# Patient Record
Sex: FEMALE | Race: WHITE | NOT HISPANIC OR LATINO | Employment: FULL TIME | URBAN - METROPOLITAN AREA
[De-identification: names, ages, dates, MRNs, and addresses within clinical notes are randomized per-mention and may not be internally consistent; named-entity substitution may affect disease eponyms.]

---

## 2017-01-05 ENCOUNTER — RADIANT APPOINTMENT (OUTPATIENT)
Dept: MAMMOGRAPHY | Facility: CLINIC | Age: 47
End: 2017-01-05
Payer: COMMERCIAL

## 2017-01-05 ENCOUNTER — OFFICE VISIT (OUTPATIENT)
Dept: OBGYN | Facility: CLINIC | Age: 47
End: 2017-01-05
Payer: COMMERCIAL

## 2017-01-05 VITALS
DIASTOLIC BLOOD PRESSURE: 82 MMHG | SYSTOLIC BLOOD PRESSURE: 138 MMHG | HEART RATE: 80 BPM | HEIGHT: 65 IN | BODY MASS INDEX: 28.99 KG/M2 | WEIGHT: 174 LBS

## 2017-01-05 DIAGNOSIS — Z12.31 VISIT FOR SCREENING MAMMOGRAM: ICD-10-CM

## 2017-01-05 DIAGNOSIS — Z12.4 CERVICAL CANCER SCREENING: ICD-10-CM

## 2017-01-05 DIAGNOSIS — Z01.419 ENCOUNTER FOR GYNECOLOGICAL EXAMINATION WITHOUT ABNORMAL FINDING: Primary | ICD-10-CM

## 2017-01-05 DIAGNOSIS — Z11.51 SCREENING FOR HPV (HUMAN PAPILLOMAVIRUS): ICD-10-CM

## 2017-01-05 PROCEDURE — 99000 SPECIMEN HANDLING OFFICE-LAB: CPT | Performed by: PHYSICIAN ASSISTANT

## 2017-01-05 PROCEDURE — 99396 PREV VISIT EST AGE 40-64: CPT | Performed by: PHYSICIAN ASSISTANT

## 2017-01-05 PROCEDURE — 87624 HPV HI-RISK TYP POOLED RSLT: CPT | Performed by: PHYSICIAN ASSISTANT

## 2017-01-05 PROCEDURE — 77063 BREAST TOMOSYNTHESIS BI: CPT | Mod: TC

## 2017-01-05 PROCEDURE — G0202 SCR MAMMO BI INCL CAD: HCPCS | Mod: TC

## 2017-01-05 PROCEDURE — G0145 SCR C/V CYTO,THINLAYER,RESCR: HCPCS | Mod: 90 | Performed by: PHYSICIAN ASSISTANT

## 2017-01-05 ASSESSMENT — ANXIETY QUESTIONNAIRES
2. NOT BEING ABLE TO STOP OR CONTROL WORRYING: NOT AT ALL
6. BECOMING EASILY ANNOYED OR IRRITABLE: NOT AT ALL
3. WORRYING TOO MUCH ABOUT DIFFERENT THINGS: NOT AT ALL
GAD7 TOTAL SCORE: 0
5. BEING SO RESTLESS THAT IT IS HARD TO SIT STILL: NOT AT ALL
7. FEELING AFRAID AS IF SOMETHING AWFUL MIGHT HAPPEN: NOT AT ALL
1. FEELING NERVOUS, ANXIOUS, OR ON EDGE: NOT AT ALL

## 2017-01-05 ASSESSMENT — PATIENT HEALTH QUESTIONNAIRE - PHQ9: 5. POOR APPETITE OR OVEREATING: NOT AT ALL

## 2017-01-05 NOTE — MR AVS SNAPSHOT
"              After Visit Summary   1/5/2017    Chantal Lino    MRN: [id number]           Patient Information     Date Of Birth          1970        Visit Information        Provider Department      1/5/2017 8:30 AM Sarah Lomeli PA-C [facility]        Today's Diagnoses     Encounter for gynecological examination without abnormal finding [Z01.419]    -  1     Cervical cancer screening         Screening for HPV (human papillomavirus)            Follow-ups after your visit        Follow-up notes from your care team     Return in about 1 year (around 1/5/2018).      Who to contact     If you have questions or need follow up information about today's clinic visit or your schedule please contact [facility] directly at [phone].  Normal or non-critical lab and imaging results will be communicated to you by MyChart, letter or phone within 4 business days after the clinic has received the results. If you do not hear from us within 7 days, please contact the clinic through MyChart or phone. If you have a critical or abnormal lab result, we will notify you by phone as soon as possible.  Submit refill requests through [employer] or call your pharmacy and they will forward the refill request to us. Please allow 3 business days for your refill to be completed.          Additional Information About Your Visit        MyChart Information     [employer] lets you send messages to your doctor, view your test results, renew your prescriptions, schedule appointments and more. To sign up, go to [email or web address] . Click on \"Log in\" on the left side of the screen, which will take you to the Welcome page. Then click on \"Sign up Now\" on the right side of the page.     You will be asked to enter the access code listed below, as well as some personal information. Please follow the directions to create your username and password.     Your access code is: [email or web address]  Expires: " "2017  9:38 AM     Your access code will  in 90 days. If you need help or a new code, please call your [facility] clinic or [phone].        Care EveryWhere ID     This is your Care EveryWhere ID. This could be used by other organizations to access your [facility] medical records  [id number]        Your Vitals Were     Pulse Height BMI (Body Mass Index) Breastfeeding?          80 5' 5\" (1.651 m) 28.96 kg/m2 No         Blood Pressure from Last 3 Encounters:   17 138/82   11/30/15 122/78   11/16/15 128/78    Weight from Last 3 Encounters:   17 174 lb (78.926 kg)   11/30/15 171 lb (77.565 kg)   11/16/15 172 lb (78.019 kg)              We Performed the Following     Pap imaged thin layer screen with HPV - recommended age 30 - 65 years (select HPV order below)        Primary Care Provider Office Phone # Fax #    Mateusz Juarez [phone]       [address]   [facility] [address]        Thank you!     Thank you for choosing [facility]  for your care. Our goal is always to provide you with excellent care. Hearing back from our patients is one way we can continue to improve our services. Please take a few minutes to complete the written survey that you may receive in the mail after your visit with us. Thank you!             Your Updated Medication List - Protect others around you: Learn how to safely use, store and throw away your medicines at www.disposemymeds.org.          This list is accurate as of: 17  9:38 AM.  Always use your most recent med list.                   Brand Name Dispense Instructions for use    aspirin 81 MG tablet      Take 1 tablet by mouth daily.       FISH OIL PO      Take 1 tablet by mouth daily.       levonorgestrel 20 MCG/24HR IUD    MIRENA     1 each by Intrauterine route once       lisinopril-hydrochlorothiazide 20-12.5 MG per tablet    PRINZIDE/ZESTORETIC     Take 1 tablet by mouth daily       " MULTIVITAMIN PO      Take  by mouth.       VITAMIN D3 PO      Take 2,000 Units by mouth daily

## 2017-01-05 NOTE — PROGRESS NOTES
Chantal is a 46 year old  female who presents for annual exam.     Besides routine health maintenance, she has no other health concerns today .    HPI:  The patient's PCP is Mateusz Juarez MD.      Doing well, no concerns. Former Dr Laureano pt.   Thinks she is due for pap.   Has mammogram scheduled for today after visit.     Has Mirena IUD, placed 2014. No periods at all. Hx of fibroids and menorrhagia so is very happy with it. Thinks she will want another one when this one expires.    Blood pressure is above where is normally is, taking lisinopril 10mg daily. Will follow up with PCP as scheduled to check on dose. Also does fasting labs with Dr. Juarez.      Single, no children.   Works for [employer], office job.   Moods generally good.   +exercise, walks 4-5 days a week.       GYNECOLOGIC HISTORY:    No LMP recorded. Patient is not currently having periods (Reason: IUD).  Her current contraception method is: IUD.  She  reports that she has never smoked. She has never used smokeless tobacco.    Patient is sexually active.  STD testing offered?  Declined  Last PHQ-9 score on record =   PHQ-9 SCORE 2017   Total Score 0     Last GAD7 score on record =   RICHARD-7 SCORE 2017   Total Score 0     Alcohol Score = 1    HEALTH MAINTENANCE:  Cholesterol: 2016 Normal  Last Mammo: 2015, Result: normal, Next Mammo: today   Pap: 10/17/2012 WNL HPV-  Colonoscopy:  NA, Result: not applicable, Next Colonoscopy: 6 years.  Dexa:  NA    Health maintenance updated:  yes    HISTORY:  Obstetric History       T0      TAB0   SAB0   E0   M0   L0           Patient Active Problem List   Diagnosis     IUD (intrauterine device) in place     Hypertension     Past Surgical History   Procedure Laterality Date     Ent surgery       ear surgery, Ear tubes     Dilation and curettage       resection of fibroid-operative hysteroscopy, D and c and resection of submucosal fibroid     Hysteroscopy, laparoscopy,  "combined  2011     Procedure:COMBINED HYSTEROSCOPY, LAPAROSCOPY; diagnostic hysterectomy, dilation and curattage, laparoscopy left paraovarian cystectomy.; Surgeon:JASMYNE MICHAEL; Location:[facility]      Social History   Substance Use Topics     Smoking status: Never Smoker      Smokeless tobacco: Never Used     Alcohol Use: 0.0 oz/week     0 Standard drinks or equivalent per week      Comment: OCCASIONAL      Problem (# of Occurrences) Relation (Name,Age of Onset)    CEREBROVASCULAR DISEASE (3) Father:  of stroke-unspecified if father, aunt or PGF, Paternal Grandfather    Colon Cancer (1) Maternal Grandmother    Colon Polyps (1) Mother    DIABETES (1) Maternal Grandmother    HEART DISEASE (1) Sister (34): sister had heart attack at age 34    Hyperlipidemia (1) Unspecified    Hypertension (1) Unspecified    OSTEOPOROSIS (1) Mother            Current Outpatient Prescriptions   Medication Sig     Cholecalciferol (VITAMIN D3 PO) Take 2,000 Units by mouth daily     lisinopril-hydrochlorothiazide (PRINZIDE,ZESTORETIC) 20-12.5 MG per tablet Take 1 tablet by mouth daily     aspirin 81 MG tablet Take 1 tablet by mouth daily.     Omega-3 Fatty Acids (FISH OIL PO) Take 1 tablet by mouth daily.     Multiple Vitamin (MULTIVITAMIN OR) Take  by mouth.     levonorgestrel (MIRENA) 20 MCG/24HR IUD 1 each by Intrauterine route once     No current facility-administered medications for this visit.     No Known Allergies    Past medical, surgical, social and family histories were reviewed and updated in EPIC.    ROS:   12 point review of systems negative other than symptoms noted below.    EXAM:  /82 mmHg  Pulse 80  Ht 5' 5\" (1.651 m)  Wt 174 lb (78.926 kg)  BMI 28.96 kg/m2  Breastfeeding? No   BMI: Body mass index is 28.96 kg/(m^2).    PHYSICAL EXAM:  Constitutional:  Appearance: Well nourished, well developed, alert, in no acute distress  Neck:  Lymph Nodes:  No lymphadenopathy present    Thyroid:  Gland " size normal, nontender, no nodules or masses present  on palpation  Chest:  Respiratory Effort:  Breathing unlabored  Cardiovascular:    Heart: Auscultation:  Regular rate, normal rhythm, no murmurs present  Breasts: Inspection of Breasts:  No lymphadenopathy present    Palpation of Breasts and Axillae:  No masses present on palpation, no  breast tenderness    Axillary Lymph Nodes:  No lymphadenopathy present  Gastrointestinal:   Abdominal Examination:  Abdomen nontender to palpation, tone normal without rigidity or guarding, no masses present, umbilicus without lesions   Liver and Spleen:  No hepatomegaly present, liver nontender to palpation    Hernias:  No hernias present  Lymphatic: Lymph Nodes:  No other lymphadenopathy present  Skin:  General Inspection:  No rashes present, no lesions present, no areas of  discoloration    Genitalia and Groin:  No rashes present, no lesions present, no areas of  discoloration, no masses present  Neurologic/Psychiatric:    Mental Status:  Oriented X3     Pelvic Exam:  External Genitalia:     Normal appearance for age, no discharge present, no tenderness present, no inflammatory lesions present, color normal  Vagina:    Normal vaginal vault without central or paravaginal defects, no discharge present, no inflammatory lesions present, no masses present  Bladder:     Nontender to palpation  Urethra:   Urethral Body:  Urethra palpation normal, urethra structural support normal   Urethral Meatus:  No erythema or lesions present  Cervix:     Appearance healthy, no lesions present, nontender to palpation, no bleeding present, string present  Uterus:     Nontender to palpation, no masses present, position anteflexed, mobility: normal  Adnexa:     No adnexal tenderness present, no adnexal masses present  Perineum:     Perineum within normal limits, no evidence of trauma, no rashes or skin lesions present  Anus:     Anus within normal limits, no hemorrhoids present  Inguinal Lymph Nodes:      No lymphadenopathy present  Pubic Hair:     Normal pubic hair distribution for age  Genitalia and Groin:     No rashes present, no lesions present, no areas of discoloration, no masses present    COUNSELING:   Reviewed preventive health counseling, as reflected in patient instructions    BMI: Body mass index is 28.96 kg/(m^2).  Weight management plan: Discussed healthy diet and exercise guidelines and patient will follow up in 12 months in clinic to re-evaluate.    ASSESSMENT:  46 year old female with satisfactory annual exam.    ICD-10-CM    1. Encounter for gynecological examination without abnormal finding [Z01.419] Z01.419    2. Cervical cancer screening Z12.4 Pap imaged thin layer screen with HPV - recommended age 30 - 65 years (select HPV order below)   3. Screening for HPV (human papillomavirus) Z11.51 Pap imaged thin layer screen with HPV - recommended age 30 - 65 years (select HPV order below)       PLAN:  Follow up with your primary care provider for your other medical problems.  Increase physical activity and exercise.  PHQ-9/RICHARD-7 scores were discussed.  Alcohol score discussed.  Lab and pap smear results will be called to the patient.  Usual safety and preventative measures counseling done.  BMI >25  Mammogram today after visit.       Sarah Lomeli PA-C

## 2017-01-06 ASSESSMENT — ANXIETY QUESTIONNAIRES: GAD7 TOTAL SCORE: 0

## 2017-01-06 ASSESSMENT — PATIENT HEALTH QUESTIONNAIRE - PHQ9: SUM OF ALL RESPONSES TO PHQ QUESTIONS 1-9: 0

## 2017-01-09 NOTE — PROGRESS NOTES
Quick Note:    Normal mammogram reviewed, radiology notifying patient of results.   JOSE Valladares, MECCA    ______

## 2017-01-10 LAB
COPATH REPORT: NORMAL
PAP: NORMAL

## 2017-01-11 LAB
FINAL DIAGNOSIS: NORMAL
HPV HR 12 DNA CVX QL NAA+PROBE: NEGATIVE
HPV16 DNA SPEC QL NAA+PROBE: NEGATIVE
HPV18 DNA SPEC QL NAA+PROBE: NEGATIVE
SPECIMEN DESCRIPTION: NORMAL

## 2018-01-08 ENCOUNTER — OFFICE VISIT (OUTPATIENT)
Dept: OBGYN | Facility: CLINIC | Age: 48
End: 2018-01-08
Payer: COMMERCIAL

## 2018-01-08 ENCOUNTER — RADIANT APPOINTMENT (OUTPATIENT)
Dept: MAMMOGRAPHY | Facility: CLINIC | Age: 48
End: 2018-01-08
Payer: COMMERCIAL

## 2018-01-08 VITALS
WEIGHT: 179 LBS | SYSTOLIC BLOOD PRESSURE: 122 MMHG | HEIGHT: 65 IN | BODY MASS INDEX: 29.82 KG/M2 | DIASTOLIC BLOOD PRESSURE: 70 MMHG | HEART RATE: 68 BPM

## 2018-01-08 DIAGNOSIS — Z12.31 VISIT FOR SCREENING MAMMOGRAM: ICD-10-CM

## 2018-01-08 DIAGNOSIS — Z01.419 ENCOUNTER FOR GYNECOLOGICAL EXAMINATION WITHOUT ABNORMAL FINDING: Primary | ICD-10-CM

## 2018-01-08 PROCEDURE — 99396 PREV VISIT EST AGE 40-64: CPT | Performed by: OBSTETRICS & GYNECOLOGY

## 2018-01-08 PROCEDURE — 77063 BREAST TOMOSYNTHESIS BI: CPT | Mod: TC

## 2018-01-08 PROCEDURE — 77067 SCR MAMMO BI INCL CAD: CPT | Mod: TC

## 2018-01-08 ASSESSMENT — ANXIETY QUESTIONNAIRES
6. BECOMING EASILY ANNOYED OR IRRITABLE: NOT AT ALL
7. FEELING AFRAID AS IF SOMETHING AWFUL MIGHT HAPPEN: NOT AT ALL
GAD7 TOTAL SCORE: 0
5. BEING SO RESTLESS THAT IT IS HARD TO SIT STILL: NOT AT ALL
IF YOU CHECKED OFF ANY PROBLEMS ON THIS QUESTIONNAIRE, HOW DIFFICULT HAVE THESE PROBLEMS MADE IT FOR YOU TO DO YOUR WORK, TAKE CARE OF THINGS AT HOME, OR GET ALONG WITH OTHER PEOPLE: NOT DIFFICULT AT ALL
2. NOT BEING ABLE TO STOP OR CONTROL WORRYING: NOT AT ALL
1. FEELING NERVOUS, ANXIOUS, OR ON EDGE: NOT AT ALL
3. WORRYING TOO MUCH ABOUT DIFFERENT THINGS: NOT AT ALL

## 2018-01-08 ASSESSMENT — PATIENT HEALTH QUESTIONNAIRE - PHQ9
5. POOR APPETITE OR OVEREATING: NOT AT ALL
SUM OF ALL RESPONSES TO PHQ QUESTIONS 1-9: 1

## 2018-01-08 NOTE — PROGRESS NOTES
Chantal is a 47 year old  female who presents for annual exam.     Besides routine health maintenance,  she would like to discuss current Mirena IUD~ mini periods last couple of months.    HPI:  The patient's PCP is Mateusz Juarez MD. Chantal presents for her annual exam. She states she has started to have minimal menses on the Mirena. She is not sexually active currently. She has no other symptoms. She has a PCP at [facility] that is following her elevated cholesterol levels.       GYNECOLOGIC HISTORY:    No LMP recorded. Patient is not currently having periods (Reason: IUD).  Her current contraception method is: IUD.  She  reports that she has never smoked. She has never used smokeless tobacco.      Patient is sexually active.  STD testing offered?  Declined  Last PHQ-9 score on record =   PHQ-9 SCORE 2018   Total Score 1     Last GAD7 score on record =   RICHARD-7 SCORE 2018   Total Score 0     Alcohol Score = 1    HEALTH MAINTENANCE:  Cholesterol: 2017-normal  Last Mammo: one year ago, Result: normal, Next Mammo: today   Pap: (  Lab Results   Component Value Date    PAP NIL 2017    ) HPV-  Colonoscopy:  NA, Result: not applicable, Next Colonoscopy: 5 years.  Dexa:  NA    Health maintenance updated:  yes    HISTORY:  Obstetric History       T0      L0     SAB0   TAB0   Ectopic0   Multiple0   Live Births0           Patient Active Problem List   Diagnosis     IUD (intrauterine device) in place     Hypertension     Past Surgical History:   Procedure Laterality Date     DILATION AND CURETTAGE      resection of fibroid-operative hysteroscopy, D and c and resection of submucosal fibroid     ENT SURGERY      ear surgery, Ear tubes     HYSTEROSCOPY, LAPAROSCOPY, COMBINED  2011    Procedure:COMBINED HYSTEROSCOPY, LAPAROSCOPY; diagnostic hysterectomy, dilation and curattage, laparoscopy left paraovarian cystectomy.; Surgeon:JASMYNE MICHAEL; Location[facility]  "History   Substance Use Topics     Smoking status: Never Smoker     Smokeless tobacco: Never Used     Alcohol use 0.0 oz/week     0 Standard drinks or equivalent per week      Comment: OCCASIONAL      Problem (# of Occurrences) Relation (Name,Age of Onset)    CEREBROVASCULAR DISEASE (3) Other: aunt, Father:  of stroke-unspecified if father, aunt or PGF, Paternal Grandfather    Colon Cancer (1) Maternal Grandmother    Colon Polyps (1) Mother    DIABETES (1) Maternal Grandmother    HEART DISEASE (1) Sister (34): sister had heart attack at age 34    Hyperlipidemia (1) Other: not specified    Hypertension (1) Other: not specified    OSTEOPOROSIS (1) Mother            Current Outpatient Prescriptions   Medication Sig     Cholecalciferol (VITAMIN D3 PO) Take 2,000 Units by mouth daily     lisinopril-hydrochlorothiazide (PRINZIDE,ZESTORETIC) 20-12.5 MG per tablet Take 1 tablet by mouth daily     levonorgestrel (MIRENA) 20 MCG/24HR IUD 1 each by Intrauterine route once     aspirin 81 MG tablet Take 1 tablet by mouth daily.     Omega-3 Fatty Acids (FISH OIL PO) Take 1 tablet by mouth daily.     Multiple Vitamin (MULTIVITAMIN OR) Take  by mouth.     No current facility-administered medications for this visit.      No Known Allergies    Past medical, surgical, social and family histories were reviewed and updated in EPIC.    ROS:   12 point review of systems negative other than symptoms noted below.  Genitourinary: Spotting  Skin: Skin Dryness    EXAM:  /70  Pulse 68  Ht 5' 5\" (1.651 m)  Wt 179 lb (81.2 kg)  Breastfeeding? No  BMI 29.79 kg/m2   BMI: Body mass index is 29.79 kg/(m^2).    PHYSICAL EXAM:  Constitutional:  Appearance: Well nourished, well developed, alert, in no acute distress  Neck:  Lymph Nodes:  No lymphadenopathy present    Thyroid:  Gland size normal, nontender, no nodules or masses present on palpation  Chest:  Respiratory Effort:  Breathing unlabored, CTAB  Cardiovascular:    Heart: " Auscultation:  Regular rate, normal rhythm, no murmurs present  Breasts: Palpation of Breasts and Axillae:  No masses present on palpation, no breast tenderness., Axillary Lymph Nodes:  No lymphadenopathy present., No nodularity, asymmetry or nipple discharge bilaterally. and no skin changes noted.  Gastrointestinal:   Abdominal Examination:  Abdomen nontender to palpation, tone normal without rigidity or guarding, no masses present, umbilicus without lesions   Liver and Spleen:  No hepatomegaly present, liver nontender to palpation    Hernias:  No hernias present  Lymphatic: Lymph Nodes:  No other lymphadenopathy present  Skin:  General Inspection:  No rashes present, no lesions present, no areas of  discoloration    Genitalia and Groin:  No rashes present, no lesions present, no areas of  discoloration, no masses present  Neurologic/Psychiatric:    Mental Status:  Oriented X3     Pelvic Exam:  External Genitalia:     Normal appearance for age, no discharge present, no tenderness present, no inflammatory lesions present, color normal  Vagina:    Normal vaginal vault without central or paravaginal defects, no discharge present, no inflammatory lesions present, no masses present  Bladder:     Nontender to palpation  Urethra:   Urethral Body:  Urethra palpation normal, urethra structural support normal   Urethral Meatus:  No erythema or lesions present  Cervix:     Appearance healthy, no lesions present, nontender to palpation, no bleeding present, string present  Uterus:     Nontender to palpation, no masses present, position anteflexed, mobility: normal  Adnexa:     No adnexal tenderness present, no adnexal masses present  Perineum:     Perineum within normal limits, no evidence of trauma, no rashes or skin lesions present    Genitalia and Groin:     No rashes present, no lesions present, no areas of discoloration, no masses present      COUNSELING:        Regular exercise       Healthy diet/nutrition        Osteoporosis Prevention/Bone Health      BMI: Body mass index is 29.79 kg/(m^2).  Weight management plan: Discussed healthier diet. Cutting out sweets. Encouraging continued exercise.    ASSESSMENT:  47 year old female with satisfactory annual exam.    ICD-10-CM    1. Encounter for gynecological examination without abnormal finding Z01.419        PLAN:  No pap smear performed today.  Discussed normal positioning of IUD. Not due to be replaced until next year  Mammogram today.   Advised that her triglyceride level will need medication if it continues to be elevated. She was referred to her PCP for this. The last level was 201 in June of 2017.   BP is well controlled on Lisinopril-HCTZ    Shari Shah MD

## 2018-01-09 ASSESSMENT — ANXIETY QUESTIONNAIRES: GAD7 TOTAL SCORE: 0

## 2019-01-14 ENCOUNTER — ANCILLARY PROCEDURE (OUTPATIENT)
Dept: MAMMOGRAPHY | Facility: CLINIC | Age: 49
End: 2019-01-14
Payer: COMMERCIAL

## 2019-01-14 ENCOUNTER — OFFICE VISIT (OUTPATIENT)
Dept: OBGYN | Facility: CLINIC | Age: 49
End: 2019-01-14
Payer: COMMERCIAL

## 2019-01-14 VITALS
WEIGHT: 177 LBS | HEIGHT: 66 IN | DIASTOLIC BLOOD PRESSURE: 88 MMHG | SYSTOLIC BLOOD PRESSURE: 136 MMHG | BODY MASS INDEX: 28.45 KG/M2

## 2019-01-14 DIAGNOSIS — Z12.31 VISIT FOR SCREENING MAMMOGRAM: ICD-10-CM

## 2019-01-14 DIAGNOSIS — Z01.419 ENCOUNTER FOR GYNECOLOGICAL EXAMINATION WITHOUT ABNORMAL FINDING: Primary | ICD-10-CM

## 2019-01-14 PROCEDURE — 77067 SCR MAMMO BI INCL CAD: CPT | Mod: TC

## 2019-01-14 PROCEDURE — 77063 BREAST TOMOSYNTHESIS BI: CPT | Mod: TC

## 2019-01-14 PROCEDURE — 99396 PREV VISIT EST AGE 40-64: CPT | Performed by: OBSTETRICS & GYNECOLOGY

## 2019-01-14 ASSESSMENT — ANXIETY QUESTIONNAIRES

## 2019-01-14 ASSESSMENT — MIFFLIN-ST. JEOR: SCORE: 1451.68

## 2019-01-14 ASSESSMENT — PATIENT HEALTH QUESTIONNAIRE - PHQ9
SUM OF ALL RESPONSES TO PHQ QUESTIONS 1-9: 0
5. POOR APPETITE OR OVEREATING: NOT AT ALL

## 2019-01-14 NOTE — PROGRESS NOTES
Chantal is a 48 year old  female who presents for annual exam.     Besides routine health maintenance,  she would like to discuss IUD removal.    HPI:  The patient's PCP is Mateusz Juarez MD.    Starting to have vaginal spotting on the Mirena IUD. Not sexually active and would like to continue to get the benefit of period control. She is exercising up to 3 hours per week.      GYNECOLOGIC HISTORY:    No LMP recorded. Patient is not currently having periods (Reason: IUD).  Her current contraception method is: IUD.  She  reports that  has never smoked. she has never used smokeless tobacco.    Patient is not sexually active.  STD testing offered?  Declined  Last PHQ-9 score on record =   PHQ-9 SCORE 2019   PHQ-9 Total Score 0     Last GAD7 score on record =   RICHARD-7 SCORE 2019   Total Score 0     Alcohol Score = 1    HEALTH MAINTENANCE:  Cholesterol: (No results found for: CHOL 18   Total= 189, Triglycerides=222, HDL=26, HFS=839, HNB=561  Last Mammo: one year ago, Result: normal, Next Mammo: today   Pap: (  Lab Results   Component Value Date    PAP NIL 2017 WNL HPV (-)neg  Colonoscopy:  NA, Result: not applicable, Next Colonoscopy: NA years.  Dexa:  NA    Health maintenance updated:  yes    HISTORY:  Obstetric History       T0      L0     SAB0   TAB0   Ectopic0   Multiple0   Live Births0           Patient Active Problem List   Diagnosis     IUD (intrauterine device) in place     Hypertension     Past Surgical History:   Procedure Laterality Date     DILATION AND CURETTAGE      resection of fibroid-operative hysteroscopy, D and c and resection of submucosal fibroid     ENT SURGERY      ear surgery, Ear tubes     HYSTEROSCOPY, LAPAROSCOPY, COMBINED  2011    Procedure:COMBINED HYSTEROSCOPY, LAPAROSCOPY; diagnostic hysterectomy, dilation and curattage, laparoscopy left paraovarian cystectomy.; Surgeon:JASMYNE MICHAEL; Location:[facility]  "History     Tobacco Use     Smoking status: Never Smoker     Smokeless tobacco: Never Used   Substance Use Topics     Alcohol use: Yes     Alcohol/week: 0.0 oz     Comment: OCCASIONAL      Problem (# of Occurrences) Relation (Name,Age of Onset)    Cerebrovascular Disease (3) Other: aunt, Father:  of stroke-unspecified if father, aunt or PGF, Paternal Grandfather    Colon Cancer (1) Maternal Grandmother    Colon Polyps (1) Mother    Diabetes (1) Maternal Grandmother    Heart Disease (1) Sister (34): sister had heart attack at age 34    Hyperlipidemia (1) Other: not specified    Hypertension (1) Other: not specified    Osteoporosis (1) Mother            Current Outpatient Medications   Medication Sig     aspirin 81 MG tablet Take 1 tablet by mouth daily.     Cholecalciferol (VITAMIN D3 PO) Take 2,000 Units by mouth daily     levonorgestrel (MIRENA) 20 MCG/24HR IUD 1 each by Intrauterine route once     lisinopril-hydrochlorothiazide (PRINZIDE,ZESTORETIC) 20-12.5 MG per tablet Take 1 tablet by mouth daily     Multiple Vitamin (MULTIVITAMIN OR) Take  by mouth.     Omega-3 Fatty Acids (FISH OIL PO) Take 1 tablet by mouth daily.     No current facility-administered medications for this visit.      No Known Allergies    Past medical, surgical, social and family histories were reviewed and updated in EPIC.    ROS:   12 point review of systems negative other than symptoms noted below.  Genitourinary: Irregular Menses and Spotting  Skin: Skin Dryness    EXAM:  /88   Ht 1.664 m (5' 5.5\")   Wt 80.3 kg (177 lb)   Breastfeeding? No   BMI 29.01 kg/m     BMI: Body mass index is 29.01 kg/m .    PHYSICAL EXAM:  Constitutional:  Appearance: Well nourished, well developed, alert, in no acute distress  Neck:  Lymph Nodes:  No lymphadenopathy present    Thyroid:  Gland size normal, nontender, no nodules or masses present on palpation  Chest:  Respiratory Effort:  Breathing unlabored, CTAB  Cardiovascular:    Heart: " Auscultation:  Regular rate, normal rhythm, no murmurs present  Breasts: Palpation of Breasts and Axillae:  No masses present on palpation, no breast tenderness., Axillary Lymph Nodes:  No lymphadenopathy present., No nodularity, asymmetry or nipple discharge bilaterally. and no skin changes  Gastrointestinal:   Abdominal Examination:  Abdomen nontender to palpation, tone normal without rigidity or guarding, no masses present, umbilicus without lesions   Liver and Spleen:  No hepatomegaly present, liver nontender to palpation    Hernias:  No hernias present  Lymphatic: Lymph Nodes:  No other lymphadenopathy present  Skin:  General Inspection:  No rashes present, no lesions present, no areas of  discoloration    Genitalia and Groin:  No rashes present, no lesions present, no areas of  discoloration, no masses present  Neurologic/Psychiatric:    Mental Status:  Oriented X3     Pelvic Exam:  External Genitalia:     Normal appearance for age, no discharge present, no tenderness present, no inflammatory lesions present, color normal  Vagina:     Normal vaginal vault without central or paravaginal defects, no discharge present, no inflammatory lesions present, no masses present  Bladder:     Nontender to palpation  Urethra:   Urethral Body:  Urethra palpation normal, urethra structural support normal   Urethral Meatus:  No erythema or lesions present  Cervix:     Appearance healthy, no lesions present, nontender to palpation, no bleeding present. IUD strings NOT seen and not palpable.   Uterus:     Uterus: firm, normal sized and nontender, midplane in position.   Adnexa:     No adnexal tenderness present, no adnexal masses present  Perineum:     Perineum within normal limits, no evidence of trauma, no rashes or skin lesions present  Genitalia and Groin:     No rashes present, no lesions present, no areas of discoloration, no masses present      COUNSELING:   Reviewed preventive health counseling, as reflected in patient  instructions       Regular exercise       Healthy diet/nutrition    BMI: Body mass index is 29.01 kg/m .  Weight management plan: Discussed healthy diet and exercise guidelines    ASSESSMENT:  48 year old female with satisfactory annual exam.    ICD-10-CM    1. Encounter for gynecological examination without abnormal finding Z01.419        PLAN:  Pap smear not done.  IUD strings not seen. Asked to come back for IUD removal and replacement.   Discussed increased risk of heart disease with her triglyceride level from June.  Discussed checking TSH yearly.    Shari Shah MD

## 2019-01-15 ASSESSMENT — ANXIETY QUESTIONNAIRES: GAD7 TOTAL SCORE: 0

## 2019-01-21 ENCOUNTER — OFFICE VISIT (OUTPATIENT)
Dept: OBGYN | Facility: CLINIC | Age: 49
End: 2019-01-21
Payer: COMMERCIAL

## 2019-01-21 VITALS
DIASTOLIC BLOOD PRESSURE: 62 MMHG | SYSTOLIC BLOOD PRESSURE: 116 MMHG | HEIGHT: 66 IN | WEIGHT: 180 LBS | HEART RATE: 68 BPM | BODY MASS INDEX: 28.93 KG/M2

## 2019-01-21 DIAGNOSIS — Z30.433 ENCOUNTER FOR REMOVAL AND REINSERTION OF INTRAUTERINE CONTRACEPTIVE DEVICE: Primary | ICD-10-CM

## 2019-01-21 PROCEDURE — 58300 INSERT INTRAUTERINE DEVICE: CPT | Performed by: OBSTETRICS & GYNECOLOGY

## 2019-01-21 PROCEDURE — 58301 REMOVE INTRAUTERINE DEVICE: CPT | Performed by: OBSTETRICS & GYNECOLOGY

## 2019-01-21 RX ORDER — MEDROXYPROGESTERONE ACETATE 5 MG
5 TABLET ORAL 2 TIMES DAILY
Qty: 10 TABLET | Refills: 0 | Status: ON HOLD | OUTPATIENT
Start: 2019-01-21 | End: 2019-05-01

## 2019-01-21 ASSESSMENT — MIFFLIN-ST. JEOR: SCORE: 1465.28

## 2019-01-21 NOTE — PROGRESS NOTES
SUBJECTIVE:    Is a pregnancy test required: No.  Was a consent obtained?  Yes    Subjective: Chantal Lino is a 48 year old  presents for IUD and desires Mirena type IUD.  She requests removal of the IUD because the IUD effectiveness has     Patient has been given the opportunity to ask questions about all forms of birth control, including all options appropriate for Chantal Lino. Discussed that no method of birth control, except abstinence is 100% effective against pregnancy or sexually transmitted infection.     Chantal Lino understands she may have the IUD removed at any time. IUD should be removed by a health care provider and the current IUD will be removed today.    The entire removal and insertion procedure was reviewed with the patient, including care after placement.    Today's PHQ-2 Score: No flowsheet data found.    PROCEDURE:    Premedicated with ibuprofen.  A speculum exam was performed and the cervix was visualized. The IUD itself was seen at the external os, where it had not been seen in her prior exam. Using ring forceps, the string was grasped and the IUD removed intact.    Under sterile technique, cervix was visualized with speculum and prepped with Betadine solution swab x 3. Tenaculum was placed for stability. The uterus was gently straightened and sounded to 7.0 cm. IUD prepared for placement, and IUD inserted according to 's instructions without difficulty or significant ressitance, and deployed at the fundus. The strings were visualized and trimmed to 3.0 cm from the external os. Tenaculum was removed and hemostasis noted. Speculum removed.  Patient tolerated procedure well.    Lot # [id number]  Exp: 2021  NDC:52284-579-47      EBL: minimal    Complications: none      POST PROCEDURE:    Given 's handouts, including when to have IUD removed, list of danger s/sx, side effects and follow up recommended. Encouraged condom use for prevention of STD.  Advised to call for any fever, for prolonged or severe pain or bleeding, abnormal vaginal dischage, or unable to palpate strings. She was advised to use pain medications (ibuprofen) as needed for mild to moderate pain. Advised to follow-up in clinic in 4-6 weeks for IUD string check if unable to find strings or as directed by provider.     Given the movement of her prior IUD to the external os will treat with provera to decrease her bleeding. At her IUD check up I will perform a bedside sonogram to ensure her IUD is still at the fundus.    Shari Shah MD

## 2019-01-23 DIAGNOSIS — Z30.433 ENCOUNTER FOR REMOVAL AND REINSERTION OF INTRAUTERINE CONTRACEPTIVE DEVICE: ICD-10-CM

## 2019-01-23 RX ORDER — MEDROXYPROGESTERONE ACETATE 5 MG
5 TABLET ORAL 2 TIMES DAILY
Qty: 10 TABLET | Refills: 0 | OUTPATIENT
Start: 2019-01-23

## 2019-01-23 NOTE — TELEPHONE ENCOUNTER
Requested Prescriptions   Pending Prescriptions Disp Refills     medroxyPROGESTERone (PROVERA) 5 MG tablet 10 tablet 0     Sig: Take 1 tablet (5 mg) by mouth 2 times daily    There is no refill protocol information for this order        Last Written Prescription Date:  1/26/19  Last Fill Quantity: 10,  # refills: 0   Last office visit: 1/21/2019 with prescribing provider:  Pablo   Future Office Visit:   Next 5 appointments (look out 90 days)    Mar 04, 2019  3:30 PM CST  Office Visit with Shari Shah MD  [facility] ([facility] [address] [id number] [phone]

## 2019-01-23 NOTE — TELEPHONE ENCOUNTER
Requested Prescriptions   Pending Prescriptions Disp Refills     medroxyPROGESTERone (PROVERA) 5 MG tablet 10 tablet 0     Sig: Take 1 tablet (5 mg) by mouth 2 times daily    There is no refill protocol information for this order        Last Written Prescription Date:  1/21/19  Last Fill Quantity: 10,  # refills: 0   Last office visit: 1/21/2019 with prescribing provider:  Pablo   Future Office Visit:   Next 5 appointments (look out 90 days)    Mar 04, 2019  3:30 PM CST  [facility] [address] [id number] [phone]        Called pharmacy on refill request, this was an automated refill request. Refill refused and request not appropriate.

## 2019-03-01 ENCOUNTER — TELEPHONE (OUTPATIENT)
Dept: OBGYN | Facility: CLINIC | Age: 49
End: 2019-03-01

## 2019-03-01 DIAGNOSIS — N93.9 ABNORMAL UTERINE BLEEDING: Primary | ICD-10-CM

## 2019-03-01 RX ORDER — MEDROXYPROGESTERONE ACETATE 10 MG
10 TABLET ORAL DAILY
Qty: 10 TABLET | Refills: 0 | Status: SHIPPED | OUTPATIENT
Start: 2019-03-01 | End: 2019-03-04

## 2019-03-01 NOTE — TELEPHONE ENCOUNTER
Pt calling with recent Mirena IUD placement 1/21/19 by Dr Shah  Never had a period with first IUD in 2014    At time of placement, had some issues of bleeding the day of and ME sent her home with Povera x5 days which stopped bleeding.  Had some bleeding for one day beginning of February, no issues requiring tx that day.    Period started yesterday as a normal period. This morning she awoke and has been changing pad every couple of hours. Feeling fine at this time but every time she stands, blood gushes out. Did not go to work.   Denies dizziness, nausea, feeling faint or short of breath, instructed to go directly to ER if these Sx's arise.  Instructed to monitor bleeding, increase fluids and eat. Monitor bleeding. Seek ER care for sx's of excessive blood loss as above. Will consult on call provider and will call pt with instruction.    Dr Adamson was consulted and given hx of events above.  Order for Provera 10 mg for 10 days take one a day but if doesn't help, may increase by taking additional 10 mg later in the day (up to 20 mg daily) until seen in clinic at scheduled OV with ME 3/4/19 for IUD check and bedside US.   Rx sent to pharmacy    Called pt with Rx and instructions. Pt aware and will call or seek emergent care with excessive blood loss with sx's. Otherwise will monitor bleeding and take prescribed Provera as instructed by dr Adamson.  Pt verbalized understanding, in agreement with plan, and voiced no further questions.  Jia Amos, RN on 3/1/2019 at 11:53 AM

## 2019-03-04 ENCOUNTER — OFFICE VISIT (OUTPATIENT)
Dept: OBGYN | Facility: CLINIC | Age: 49
End: 2019-03-04
Payer: COMMERCIAL

## 2019-03-04 VITALS
HEIGHT: 66 IN | DIASTOLIC BLOOD PRESSURE: 80 MMHG | BODY MASS INDEX: 27.8 KG/M2 | SYSTOLIC BLOOD PRESSURE: 138 MMHG | WEIGHT: 173 LBS

## 2019-03-04 DIAGNOSIS — T83.32XA INTRAUTERINE CONTRACEPTIVE DEVICE THREADS LOST, INITIAL ENCOUNTER: Primary | ICD-10-CM

## 2019-03-04 DIAGNOSIS — N93.9 ABNORMAL UTERINE BLEEDING (AUB): ICD-10-CM

## 2019-03-04 DIAGNOSIS — N93.9 ABNORMAL UTERINE BLEEDING: ICD-10-CM

## 2019-03-04 PROCEDURE — 88305 TISSUE EXAM BY PATHOLOGIST: CPT | Performed by: OBSTETRICS & GYNECOLOGY

## 2019-03-04 PROCEDURE — 99214 OFFICE O/P EST MOD 30 MIN: CPT | Mod: 25 | Performed by: OBSTETRICS & GYNECOLOGY

## 2019-03-04 PROCEDURE — 58100 BIOPSY OF UTERUS LINING: CPT | Performed by: OBSTETRICS & GYNECOLOGY

## 2019-03-04 RX ORDER — MEDROXYPROGESTERONE ACETATE 10 MG
10 TABLET ORAL
Qty: 14 TABLET | Refills: 0 | Status: ON HOLD | OUTPATIENT
Start: 2019-03-04 | End: 2019-05-01

## 2019-03-04 ASSESSMENT — MIFFLIN-ST. JEOR: SCORE: 1433.53

## 2019-03-04 NOTE — PROGRESS NOTES
SUBJECTIVE:                                                   Chantal Lino is a 48 year old female who presents to clinic today for the following health issue(s):  Patient presents with:  Abnormal Uterine Bleeding: patient seen in ER yesterday for vaginal bleeding-had labs done. started having heavy bleeding on Friday, soaking through a pad in an hour. had exam done in ER-states they couldnt find IUD so thinks it is no longer intact. f/u for the IUD removal and reinsertion from 19 with Mirena IUD. taking medroxyprogesterone to help with the bleeding      HPI:  Chantal presents for follow up. At the time of her IUD replacement in January, the prior IUD was seen at the external os. The new IUD was placed without difficulty. She was bleeding at the time and was given provera to prevent the IUD being dislodged by heavy menses. She denies any pain since then. She had two other days of vaginal bleeding that were light. One month later with no antecedent pain she had gushing of blood from the vagina with the passage of large clots. She did not notice the IUD in the clots or in the toilet but she was not looking for it. She states that she presented to the ED at [facility] and the IUD was not seen on her pelvic exam. No additional imaging was done by them. Not an ultrasound or an xray. She was placed on provera twice a day and this has helped her bleeding.     No LMP recorded. Patient is not currently having periods (Reason: IUD)..   Patient is not sexually active, .  Using IUD for contraception.    reports that  has never smoked. she has never used smokeless tobacco.    STD testing offered?  Declined    Health maintenance updated:  yes    Today's PHQ-2 Score: No flowsheet data found.  Today's PHQ-9 Score:   PHQ-9 SCORE 2019   PHQ-9 Total Score 0     Today's RICHARD-7 Score:   RICHARD-7 SCORE 2019   Total Score 0       Problem list and histories reviewed & adjusted, as indicated.  Additional history: as  documented.    Patient Active Problem List   Diagnosis     IUD (intrauterine device) in place     Hypertension     Past Surgical History:   Procedure Laterality Date     DILATION AND CURETTAGE      resection of fibroid-operative hysteroscopy, D and c and resection of submucosal fibroid     ENT SURGERY      ear surgery, Ear tubes     HYSTEROSCOPY, LAPAROSCOPY, COMBINED  2011    Procedure:COMBINED HYSTEROSCOPY, LAPAROSCOPY; diagnostic hysterectomy, dilation and curattage, laparoscopy left paraovarian cystectomy.; Surgeon:JASMYNE MICHAEL; Location:[facility]      Social History     Tobacco Use     Smoking status: Never Smoker     Smokeless tobacco: Never Used   Substance Use Topics     Alcohol use: Yes     Alcohol/week: 0.0 oz     Comment: OCCASIONAL      Problem (# of Occurrences) Relation (Name,Age of Onset)    Cerebrovascular Disease (3) Other: aunt, Father:  of stroke-unspecified if father, aunt or PGF, Paternal Grandfather    Colon Cancer (1) Maternal Grandmother    Colon Polyps (1) Mother    Diabetes (1) Maternal Grandmother    Heart Disease (1) Sister (34): sister had heart attack at age 34    Hyperlipidemia (1) Other: not specified    Hypertension (1) Other: not specified    Osteoporosis (1) Mother            Current Outpatient Medications   Medication Sig     aspirin 81 MG tablet Take 1 tablet by mouth daily.     Cholecalciferol (VITAMIN D3 PO) Take 2,000 Units by mouth daily     levonorgestrel (MIRENA) 20 MCG/24HR IUD 1 each by Intrauterine route once     lisinopril-hydrochlorothiazide (PRINZIDE,ZESTORETIC) 20-12.5 MG per tablet Take 1 tablet by mouth daily     medroxyPROGESTERone (PROVERA) 10 MG tablet Take 1 tablet (10 mg) by mouth 2 times daily for 7 days     medroxyPROGESTERone (PROVERA) 5 MG tablet Take 1 tablet (5 mg) by mouth 2 times daily for 5 days     Multiple Vitamin (MULTIVITAMIN OR) Take  by mouth.     Omega-3 Fatty Acids (FISH OIL PO) Take 1 tablet by mouth daily.     No  "current facility-administered medications for this visit.      No Known Allergies    ROS:  12 point review of systems negative other than symptoms noted below.  Genitourinary: Heavy Bleeding with Period    OBJECTIVE:     /80   Ht 1.664 m (5' 5.5\")   Wt 78.5 kg (173 lb)   BMI 28.35 kg/m    Body mass index is 28.35 kg/m .    Exam:  Constitutional:  Appearance: Well nourished, well developed alert, in no acute distress  Chest:  Respiratory Effort:  Breathing unlabored  Gastrointestinal:  Abdominal Examination:  Abdomen nontender to palpation, tone normal without rigidity or guarding, no masses present, umbilicus without lesions; Liver/Spleen:  No hepatomegaly present, liver nontender to palpation; Hernias:  No hernias present  Neurologic/Psychiatric:  Mental Status:  Oriented X3   Cervix: IUD strings not seen.    Bedside sonogram: on transvaginal ultrasound the IUD was not seen in the endometrial cavity. Intramural fibroids seen. Bilateral ovaries seen and normal without masses. No free pelvic fluid    In-Clinic Test Results:  No results found for this or any previous visit (from the past 24 hour(s)).    ASSESSMENT/PLAN:                                                        ICD-10-CM    1. Intrauterine contraceptive device threads lost, initial encounter T83.32XA XR Abdomen 2 Views     XR Pelvis 1/2 Views   2. Abnormal uterine bleeding (AUB) N93.9 Surgical pathology exam     ENDOMETRIAL BIOPSY W/O CERVICAL DILATION   3. Abnormal uterine bleeding N93.9 medroxyPROGESTERone (PROVERA) 10 MG tablet     There are two possibilities, either the IUD was pushed out with the heavy bleeding or it perforated through the uterus. We will send her for a pelvic and abdominal xray to assess.  Her history is more suspicious for the IUD being pushed out with her heavy bleeding since she had no pain and did not have a traumatic placement.    Given her age we performed an endometrial biopsy today. She has intramural leiomyoma seen " on ultrasound today. She is seeking definitive treatment with a hysterectomy. We discussed a laparoscopic supracervical hysterectomy. I counseled her that she would need to be seen by her PCP for medication management and clearance prior to surgery. I will proceed with scheduling once I have the results of her xray.    Shari Shah MD  [facility] [place]      INDICATIONS:                                                    Is a pregnancy test required: No.  Was a consent obtained?  Yes    Having endometrial biopsy for abnormal uterine bleeding    Today's PHQ-2 Score: No flowsheet data found.    PROCEDURE;                                                      A speculum was placed in the vagina and cervix prepped with betadine. A tenaculum was attached to the cervix. A small plastic 5 mm Pipelle syringe curette was inserted into the cervical canal. The uterus was sounded to 9 cm's. A vigorous four quadrant biopsy was performed, removing amount large  of tissue and dark clotted blood. The speculum was removed. This tissue was placed in Formal[place] and sent to pathology.    The patient tolerated the procedure  well and she reported there was  cramping.      POST PROCEDURE;                                                      There  was no cramping at the time of discharge. She  tolerated the procedure well. There were no complications. Patient was discharged in stable condition.    Patient advised to call the clinic if severe pelvic pain, fever or heavy bleeding.    Shari Shah MD

## 2019-03-05 ENCOUNTER — HOSPITAL ENCOUNTER (OUTPATIENT)
Dept: GENERAL RADIOLOGY | Facility: CLINIC | Age: 49
Discharge: HOME | End: 2019-03-05
Attending: OBSTETRICS & GYNECOLOGY | Admitting: OBSTETRICS & GYNECOLOGY
Payer: COMMERCIAL

## 2019-03-05 DIAGNOSIS — T83.32XA INTRAUTERINE CONTRACEPTIVE DEVICE THREADS LOST, INITIAL ENCOUNTER: ICD-10-CM

## 2019-03-05 PROCEDURE — 74018 RADEX ABDOMEN 1 VIEW: CPT

## 2019-03-05 RX ORDER — MEDROXYPROGESTERONE ACETATE 10 MG
10 TABLET ORAL DAILY
Qty: 30 TABLET | Refills: 1 | Status: ON HOLD | OUTPATIENT
Start: 2019-03-05 | End: 2019-05-01

## 2019-03-06 ENCOUNTER — TELEPHONE (OUTPATIENT)
Dept: OBGYN | Facility: CLINIC | Age: 49
End: 2019-03-06

## 2019-03-06 LAB — COPATH REPORT: NORMAL

## 2019-03-06 NOTE — TELEPHONE ENCOUNTER
Type of surgery: GENOVEVA BARRIOS DIAG CYSTO (added per verbal from ME)  Location of surgery: [facility]  Date and time of surgery: 5/1/2019 7:30a ARRIVAL 5:30a  Surgeon: Pablo howe/Jodie to Assist  Pre-Op Appt Date: PRIMARY - [facility]  Post-Op Appt Date: TBD   Packet sent out: MAILED 3/6/2019  Pre-cert/Authorization completed:  TBD  Date: 3/6/2019 Rony howe/Luz Elena Garza  Surgery Scheduler

## 2019-03-06 NOTE — TELEPHONE ENCOUNTER
Order Questions     Question Answer Comment   Procedure name(s) - multi select Laparoscopic supracervical hysterectomy and bilateral salpingectomy    Is this a multi surgeon case? Yes    Laterality N/A    Reason for procedure Heavy menstrual bleeding    Location of Case: [facility]    Surgeon Procedure Time (incision to closure) in minutes (per procedure as applicable) 90    Note:  Surgical Case Time Needed (in minutes)   Patient Class (for admit prior to surgery, specify number of days in comments): Same day (hospital outpatient)    Why can t this outpatient surgery be done at the [facility] or [facility]? NA    Anesthesia General    H&P To Be Completed By: PCP    Post-Op Appointment 2 weeks    Procedure will be performed or supervised by: Shari Shah MD    Surgical Assistant Julian Feliciano MD    Vendor Needed? No    Blood Needed? No Blood

## 2019-03-06 NOTE — LETTER
April 18, 2019    Chantal Lino                                                                                                                     [address]    [facility] [address]      Dear Chantal,    We have made effort to obtain an accurate quote from your insurance company based on the information we have on file. Your actual coverage may differ. [facility] can NOT guarantee coverage. We recommend that if you have questions regarding coverage to call your insurance company. Our billing department is happy to assist you with your insurance claim but you are responsible for all services rendered whether or not they are paid by your insurance provider. The below information was obtained from your insurance company but again, we do not guarantee coverage.       Insurance [place] Phone # [phone]  ID [id number]         Group [id number]  Divina spoke w/ Bill on 4/18/2019    Policy Eff Date 1/1/2019 and current Yes  CoInsurance (covered at) 100% after deductible has been met.  Deductible $2000 met to date $2000  Max Out Of Pocket (MOOP) $3000 met to date $2035.12  CoPay $NA  Prior Auth Required:  No  Pre Existing Condition No  Surgeon MAGDY Shah In Network Yes  Hospital [facility] In Network Yes  Referral Needed:  No.  Mailed to Patient Yes                  If No Document why by date                  If Yes                                   Date 4/18/2019      Sincerely,         Divina Garza  Surgery Scheduler

## 2019-03-25 ENCOUNTER — TELEPHONE (OUTPATIENT)
Dept: OBGYN | Facility: CLINIC | Age: 49
End: 2019-03-25

## 2019-03-25 NOTE — TELEPHONE ENCOUNTER
Patient scheduled for surgery 5/1, she would like a refill on Provera to get her through until the surgery.  [facility].

## 2019-03-25 NOTE — TELEPHONE ENCOUNTER
Pt requesting refill of provera until surgery. Informed Rx sent 3/5/19 for 30 tabs with 1 RF. Pt states only received 20 pills. Did not know had refill. Called [facility] to see if refill available. Informed refill is available. Will fill for pt. Pt informed.

## 2019-04-04 ENCOUNTER — TRANSFERRED RECORDS (OUTPATIENT)
Dept: HEALTH INFORMATION MANAGEMENT | Facility: CLINIC | Age: 49
End: 2019-04-04

## 2019-04-18 NOTE — TELEPHONE ENCOUNTER
Insurance Co [address] Phone # [phone]  ID [id number]         Group [id number]  Divina spoke w/ Bill on 4/18/2019    Policy Eff Date 1/1/2019 and current Yes  CoInsurance (covered at) 100% after deductible has been met.  Deductible $2000 met to date $2000  Max Out Of Pocket (MOOP) $3000 met to date $2035.12  CoPay $NA  Prior Auth Required:  No  Pre Existing Condition No  Surgeon MAGDY Shah In Network Yes  Hospital [facility] In Network Yes  Referral Needed:  No.  Mailed to Patient Yes                  If No Document why by date                  If Yes                                   Date 4/18/2019      Divina Garza  Surgery Scheduler

## 2019-04-29 PROBLEM — D25.1 INTRAMURAL LEIOMYOMA OF UTERUS: Status: ACTIVE | Noted: 2019-04-29

## 2019-05-01 ENCOUNTER — ANESTHESIA (OUTPATIENT)
Dept: SURGERY | Facility: CLINIC | Age: 49
End: 2019-05-01
Payer: COMMERCIAL

## 2019-05-01 ENCOUNTER — ANESTHESIA EVENT (OUTPATIENT)
Dept: SURGERY | Facility: CLINIC | Age: 49
End: 2019-05-01
Payer: COMMERCIAL

## 2019-05-01 ENCOUNTER — HOSPITAL ENCOUNTER (OUTPATIENT)
Facility: CLINIC | Age: 49
Discharge: HOME | End: 2019-05-01
Attending: OBSTETRICS & GYNECOLOGY | Admitting: OBSTETRICS & GYNECOLOGY
Payer: COMMERCIAL

## 2019-05-01 VITALS
TEMPERATURE: 96.8 F | RESPIRATION RATE: 12 BRPM | SYSTOLIC BLOOD PRESSURE: 126 MMHG | BODY MASS INDEX: 28.96 KG/M2 | HEIGHT: 65 IN | WEIGHT: 173.8 LBS | HEART RATE: 77 BPM | DIASTOLIC BLOOD PRESSURE: 81 MMHG | OXYGEN SATURATION: 97 %

## 2019-05-01 DIAGNOSIS — Z90.710 S/P HYSTERECTOMY: Primary | ICD-10-CM

## 2019-05-01 LAB — B-HCG SERPL-ACNC: <1 IU/L (ref 0–5)

## 2019-05-01 PROCEDURE — 25000128 H RX IP 250 OP 636: Performed by: OBSTETRICS & GYNECOLOGY

## 2019-05-01 PROCEDURE — 36415 COLL VENOUS BLD VENIPUNCTURE: CPT | Performed by: OBSTETRICS & GYNECOLOGY

## 2019-05-01 PROCEDURE — 88307 TISSUE EXAM BY PATHOLOGIST: CPT | Mod: 26 | Performed by: OBSTETRICS & GYNECOLOGY

## 2019-05-01 PROCEDURE — 36000093 ZZH SURGERY LEVEL 4 1ST 30 MIN: Performed by: OBSTETRICS & GYNECOLOGY

## 2019-05-01 PROCEDURE — 25800030 ZZH RX IP 258 OP 636: Performed by: OBSTETRICS & GYNECOLOGY

## 2019-05-01 PROCEDURE — 27210794 ZZH OR GENERAL SUPPLY STERILE: Performed by: OBSTETRICS & GYNECOLOGY

## 2019-05-01 PROCEDURE — 71000027 ZZH RECOVERY PHASE 2 EACH 15 MINS: Performed by: OBSTETRICS & GYNECOLOGY

## 2019-05-01 PROCEDURE — 25800025 ZZH RX 258: Performed by: OBSTETRICS & GYNECOLOGY

## 2019-05-01 PROCEDURE — 25000125 ZZHC RX 250: Performed by: NURSE ANESTHETIST, CERTIFIED REGISTERED

## 2019-05-01 PROCEDURE — 71000012 ZZH RECOVERY PHASE 1 LEVEL 1 FIRST HR: Performed by: OBSTETRICS & GYNECOLOGY

## 2019-05-01 PROCEDURE — 25000566 ZZH SEVOFLURANE, EA 15 MIN: Performed by: OBSTETRICS & GYNECOLOGY

## 2019-05-01 PROCEDURE — 40000170 ZZH STATISTIC PRE-PROCEDURE ASSESSMENT II: Performed by: OBSTETRICS & GYNECOLOGY

## 2019-05-01 PROCEDURE — 25000125 ZZHC RX 250: Performed by: OBSTETRICS & GYNECOLOGY

## 2019-05-01 PROCEDURE — 36000063 ZZH SURGERY LEVEL 4 EA 15 ADDTL MIN: Performed by: OBSTETRICS & GYNECOLOGY

## 2019-05-01 PROCEDURE — 71000013 ZZH RECOVERY PHASE 1 LEVEL 1 EA ADDTL HR: Performed by: OBSTETRICS & GYNECOLOGY

## 2019-05-01 PROCEDURE — 58542 LSH W/T/O UT 250 G OR LESS: CPT | Performed by: OBSTETRICS & GYNECOLOGY

## 2019-05-01 PROCEDURE — 84702 CHORIONIC GONADOTROPIN TEST: CPT | Performed by: OBSTETRICS & GYNECOLOGY

## 2019-05-01 PROCEDURE — 25000132 ZZH RX MED GY IP 250 OP 250 PS 637: Performed by: OBSTETRICS & GYNECOLOGY

## 2019-05-01 PROCEDURE — 37000008 ZZH ANESTHESIA TECHNICAL FEE, 1ST 30 MIN: Performed by: OBSTETRICS & GYNECOLOGY

## 2019-05-01 PROCEDURE — 25000128 H RX IP 250 OP 636: Performed by: NURSE ANESTHETIST, CERTIFIED REGISTERED

## 2019-05-01 PROCEDURE — 37000009 ZZH ANESTHESIA TECHNICAL FEE, EACH ADDTL 15 MIN: Performed by: OBSTETRICS & GYNECOLOGY

## 2019-05-01 PROCEDURE — 25800030 ZZH RX IP 258 OP 636: Performed by: NURSE ANESTHETIST, CERTIFIED REGISTERED

## 2019-05-01 PROCEDURE — 58542 LSH W/T/O UT 250 G OR LESS: CPT | Mod: 80 | Performed by: OBSTETRICS & GYNECOLOGY

## 2019-05-01 PROCEDURE — 25000128 H RX IP 250 OP 636: Performed by: ANESTHESIOLOGY

## 2019-05-01 PROCEDURE — 88307 TISSUE EXAM BY PATHOLOGIST: CPT | Performed by: OBSTETRICS & GYNECOLOGY

## 2019-05-01 RX ORDER — LIDOCAINE HYDROCHLORIDE 20 MG/ML
INJECTION, SOLUTION INFILTRATION; PERINEURAL PRN
Status: DISCONTINUED | OUTPATIENT
Start: 2019-05-01 | End: 2019-05-01

## 2019-05-01 RX ORDER — ONDANSETRON 2 MG/ML
INJECTION INTRAMUSCULAR; INTRAVENOUS PRN
Status: DISCONTINUED | OUTPATIENT
Start: 2019-05-01 | End: 2019-05-01

## 2019-05-01 RX ORDER — OXYCODONE AND ACETAMINOPHEN 5; 325 MG/1; MG/1
2 TABLET ORAL
Status: DISCONTINUED | OUTPATIENT
Start: 2019-05-01 | End: 2019-05-01 | Stop reason: HOSPADM

## 2019-05-01 RX ORDER — ONDANSETRON 4 MG/1
4 TABLET, ORALLY DISINTEGRATING ORAL
Status: DISCONTINUED | OUTPATIENT
Start: 2019-05-01 | End: 2019-05-01 | Stop reason: HOSPADM

## 2019-05-01 RX ORDER — NEOSTIGMINE METHYLSULFATE 1 MG/ML
VIAL (ML) INJECTION PRN
Status: DISCONTINUED | OUTPATIENT
Start: 2019-05-01 | End: 2019-05-01

## 2019-05-01 RX ORDER — OXYCODONE AND ACETAMINOPHEN 5; 325 MG/1; MG/1
1-2 TABLET ORAL EVERY 4 HOURS PRN
Qty: 20 TABLET | Refills: 0 | Status: SHIPPED | OUTPATIENT
Start: 2019-05-01 | End: 2019-05-31

## 2019-05-01 RX ORDER — GLYCOPYRROLATE 0.2 MG/ML
INJECTION, SOLUTION INTRAMUSCULAR; INTRAVENOUS PRN
Status: DISCONTINUED | OUTPATIENT
Start: 2019-05-01 | End: 2019-05-01

## 2019-05-01 RX ORDER — IBUPROFEN 600 MG/1
600 TABLET, FILM COATED ORAL
Status: COMPLETED | OUTPATIENT
Start: 2019-05-01 | End: 2019-05-01

## 2019-05-01 RX ORDER — ONDANSETRON 4 MG/1
4-8 TABLET, ORALLY DISINTEGRATING ORAL EVERY 8 HOURS PRN
Qty: 4 TABLET | Refills: 0 | Status: SHIPPED | OUTPATIENT
Start: 2019-05-01 | End: 2019-05-31

## 2019-05-01 RX ORDER — MAGNESIUM HYDROXIDE 1200 MG/15ML
LIQUID ORAL PRN
Status: DISCONTINUED | OUTPATIENT
Start: 2019-05-01 | End: 2019-05-01 | Stop reason: HOSPADM

## 2019-05-01 RX ORDER — FENTANYL CITRATE 50 UG/ML
INJECTION, SOLUTION INTRAMUSCULAR; INTRAVENOUS PRN
Status: DISCONTINUED | OUTPATIENT
Start: 2019-05-01 | End: 2019-05-01

## 2019-05-01 RX ORDER — IBUPROFEN 600 MG/1
600 TABLET, FILM COATED ORAL EVERY 6 HOURS PRN
Qty: 90 TABLET | Refills: 0 | Status: SHIPPED | OUTPATIENT
Start: 2019-05-01 | End: 2019-05-31

## 2019-05-01 RX ORDER — PHENAZOPYRIDINE HYDROCHLORIDE 200 MG/1
200 TABLET, FILM COATED ORAL ONCE
Status: COMPLETED | OUTPATIENT
Start: 2019-05-01 | End: 2019-05-01

## 2019-05-01 RX ORDER — BUPIVACAINE HYDROCHLORIDE AND EPINEPHRINE 5; 5 MG/ML; UG/ML
INJECTION, SOLUTION EPIDURAL; INTRACAUDAL; PERINEURAL
Status: DISCONTINUED
Start: 2019-05-01 | End: 2019-05-01 | Stop reason: HOSPADM

## 2019-05-01 RX ORDER — FENTANYL CITRATE 50 UG/ML
25-50 INJECTION, SOLUTION INTRAMUSCULAR; INTRAVENOUS
Status: DISCONTINUED | OUTPATIENT
Start: 2019-05-01 | End: 2019-05-01 | Stop reason: HOSPADM

## 2019-05-01 RX ORDER — ACETAMINOPHEN 325 MG/1
975 TABLET ORAL ONCE
Status: COMPLETED | OUTPATIENT
Start: 2019-05-01 | End: 2019-05-01

## 2019-05-01 RX ORDER — CEFAZOLIN SODIUM 2 G/100ML
2 INJECTION, SOLUTION INTRAVENOUS
Status: COMPLETED | OUTPATIENT
Start: 2019-05-01 | End: 2019-05-01

## 2019-05-01 RX ORDER — PROPOFOL 10 MG/ML
INJECTION, EMULSION INTRAVENOUS PRN
Status: DISCONTINUED | OUTPATIENT
Start: 2019-05-01 | End: 2019-05-01

## 2019-05-01 RX ORDER — PROPOFOL 10 MG/ML
INJECTION, EMULSION INTRAVENOUS CONTINUOUS PRN
Status: DISCONTINUED | OUTPATIENT
Start: 2019-05-01 | End: 2019-05-01

## 2019-05-01 RX ORDER — CEFAZOLIN SODIUM 1 G/3ML
1 INJECTION, POWDER, FOR SOLUTION INTRAMUSCULAR; INTRAVENOUS SEE ADMIN INSTRUCTIONS
Status: DISCONTINUED | OUTPATIENT
Start: 2019-05-01 | End: 2019-05-01 | Stop reason: HOSPADM

## 2019-05-01 RX ORDER — NALOXONE HYDROCHLORIDE 0.4 MG/ML
.1-.4 INJECTION, SOLUTION INTRAMUSCULAR; INTRAVENOUS; SUBCUTANEOUS
Status: DISCONTINUED | OUTPATIENT
Start: 2019-05-01 | End: 2019-05-01 | Stop reason: HOSPADM

## 2019-05-01 RX ORDER — ONDANSETRON 2 MG/ML
4 INJECTION INTRAMUSCULAR; INTRAVENOUS EVERY 30 MIN PRN
Status: DISCONTINUED | OUTPATIENT
Start: 2019-05-01 | End: 2019-05-01 | Stop reason: HOSPADM

## 2019-05-01 RX ORDER — AMOXICILLIN 250 MG
1-2 CAPSULE ORAL 2 TIMES DAILY
Qty: 30 TABLET | Refills: 0 | Status: SHIPPED | OUTPATIENT
Start: 2019-05-01 | End: 2019-05-31

## 2019-05-01 RX ORDER — HYDROMORPHONE HYDROCHLORIDE 1 MG/ML
.3-.5 INJECTION, SOLUTION INTRAMUSCULAR; INTRAVENOUS; SUBCUTANEOUS EVERY 5 MIN PRN
Status: DISCONTINUED | OUTPATIENT
Start: 2019-05-01 | End: 2019-05-01 | Stop reason: HOSPADM

## 2019-05-01 RX ORDER — SODIUM CHLORIDE, SODIUM LACTATE, POTASSIUM CHLORIDE, CALCIUM CHLORIDE 600; 310; 30; 20 MG/100ML; MG/100ML; MG/100ML; MG/100ML
INJECTION, SOLUTION INTRAVENOUS CONTINUOUS
Status: DISCONTINUED | OUTPATIENT
Start: 2019-05-01 | End: 2019-05-01 | Stop reason: HOSPADM

## 2019-05-01 RX ORDER — BUPIVACAINE HYDROCHLORIDE AND EPINEPHRINE 5; 5 MG/ML; UG/ML
INJECTION, SOLUTION PERINEURAL PRN
Status: DISCONTINUED | OUTPATIENT
Start: 2019-05-01 | End: 2019-05-01 | Stop reason: HOSPADM

## 2019-05-01 RX ORDER — VASOPRESSIN 20 U/ML
INJECTION PARENTERAL
Status: DISCONTINUED
Start: 2019-05-01 | End: 2019-05-01 | Stop reason: HOSPADM

## 2019-05-01 RX ORDER — SODIUM CHLORIDE, SODIUM LACTATE, POTASSIUM CHLORIDE, CALCIUM CHLORIDE 600; 310; 30; 20 MG/100ML; MG/100ML; MG/100ML; MG/100ML
INJECTION, SOLUTION INTRAVENOUS CONTINUOUS PRN
Status: DISCONTINUED | OUTPATIENT
Start: 2019-05-01 | End: 2019-05-01

## 2019-05-01 RX ORDER — DEXAMETHASONE SODIUM PHOSPHATE 4 MG/ML
INJECTION, SOLUTION INTRA-ARTICULAR; INTRALESIONAL; INTRAMUSCULAR; INTRAVENOUS; SOFT TISSUE PRN
Status: DISCONTINUED | OUTPATIENT
Start: 2019-05-01 | End: 2019-05-01

## 2019-05-01 RX ORDER — ONDANSETRON 4 MG/1
4 TABLET, ORALLY DISINTEGRATING ORAL EVERY 30 MIN PRN
Status: DISCONTINUED | OUTPATIENT
Start: 2019-05-01 | End: 2019-05-01 | Stop reason: HOSPADM

## 2019-05-01 RX ADMIN — ROCURONIUM BROMIDE 50 MG: 10 INJECTION INTRAVENOUS at 07:36

## 2019-05-01 RX ADMIN — IBUPROFEN 600 MG: 600 TABLET ORAL at 10:22

## 2019-05-01 RX ADMIN — LIDOCAINE HYDROCHLORIDE 100 MG: 20 INJECTION, SOLUTION INFILTRATION; PERINEURAL at 07:36

## 2019-05-01 RX ADMIN — PROPOFOL 200 MG: 10 INJECTION, EMULSION INTRAVENOUS at 07:36

## 2019-05-01 RX ADMIN — ACETAMINOPHEN 975 MG: 325 TABLET, FILM COATED ORAL at 06:04

## 2019-05-01 RX ADMIN — ROCURONIUM BROMIDE 10 MG: 10 INJECTION INTRAVENOUS at 08:05

## 2019-05-01 RX ADMIN — CEFAZOLIN SODIUM 2 G: 2 INJECTION, SOLUTION INTRAVENOUS at 07:43

## 2019-05-01 RX ADMIN — ONDANSETRON 4 MG: 2 INJECTION INTRAMUSCULAR; INTRAVENOUS at 09:03

## 2019-05-01 RX ADMIN — PROPOFOL 30 MCG/KG/MIN: 10 INJECTION, EMULSION INTRAVENOUS at 08:02

## 2019-05-01 RX ADMIN — FENTANYL CITRATE 50 MCG: 50 INJECTION, SOLUTION INTRAMUSCULAR; INTRAVENOUS at 08:28

## 2019-05-01 RX ADMIN — NEOSTIGMINE METHYLSULFATE 4 MG: 1 INJECTION, SOLUTION INTRAVENOUS at 09:10

## 2019-05-01 RX ADMIN — MIDAZOLAM 2 MG: 1 INJECTION INTRAMUSCULAR; INTRAVENOUS at 07:34

## 2019-05-01 RX ADMIN — Medication 0.5 MG: at 10:03

## 2019-05-01 RX ADMIN — DEXAMETHASONE SODIUM PHOSPHATE 4 MG: 4 INJECTION, SOLUTION INTRA-ARTICULAR; INTRALESIONAL; INTRAMUSCULAR; INTRAVENOUS; SOFT TISSUE at 07:44

## 2019-05-01 RX ADMIN — FENTANYL CITRATE 50 MCG: 50 INJECTION, SOLUTION INTRAMUSCULAR; INTRAVENOUS at 08:12

## 2019-05-01 RX ADMIN — DEXMEDETOMIDINE HYDROCHLORIDE 20 MCG: 100 INJECTION, SOLUTION INTRAVENOUS at 08:23

## 2019-05-01 RX ADMIN — SODIUM CHLORIDE, POTASSIUM CHLORIDE, SODIUM LACTATE AND CALCIUM CHLORIDE: 600; 310; 30; 20 INJECTION, SOLUTION INTRAVENOUS at 09:24

## 2019-05-01 RX ADMIN — GLYCOPYRROLATE 0.8 MG: 0.2 INJECTION, SOLUTION INTRAMUSCULAR; INTRAVENOUS at 09:10

## 2019-05-01 RX ADMIN — SODIUM CHLORIDE, POTASSIUM CHLORIDE, SODIUM LACTATE AND CALCIUM CHLORIDE: 600; 310; 30; 20 INJECTION, SOLUTION INTRAVENOUS at 07:33

## 2019-05-01 RX ADMIN — FENTANYL CITRATE 100 MCG: 50 INJECTION, SOLUTION INTRAMUSCULAR; INTRAVENOUS at 07:44

## 2019-05-01 RX ADMIN — PHENAZOPYRIDINE HYDROCHLORIDE 200 MG: 200 TABLET ORAL at 06:04

## 2019-05-01 ASSESSMENT — LIFESTYLE VARIABLES: TOBACCO_USE: 0

## 2019-05-01 ASSESSMENT — MIFFLIN-ST. JEOR: SCORE: 1429.23

## 2019-05-01 NOTE — ANESTHESIA PREPROCEDURE EVALUATION
Procedure: Procedure(s):  HYSTERECTOMY, SUPRACERVICAL, LAPAROSCOPIC  LAPAROSCOPIC BILATERAL SALPINGECTOMY  CYSTOSCOPY  Preop diagnosis: HEAVY MENSTRUAL BLEEDING    No Known Allergies  Past Medical History:   Diagnosis Date     Hyperlipidemia      Hypertension     On Lisinopril     Intramural leiomyoma of uterus 4/29/2019     IUD (intrauterine device) in place 02/2014    Mirena, for heavy periods     Leiomyoma of uterus 11/09    had hysteroscopy, and resection of fibroid     Menorrhagia 2009    had operative hysteroscopy and resection of fibroid     Vitamin D deficiency      Past Surgical History:   Procedure Laterality Date     DILATION AND CURETTAGE  11/09    resection of fibroid-operative hysteroscopy, D and c and resection of submucosal fibroid     ENT SURGERY      ear surgery, Ear tubes, tympanoplasty as child     GYN SURGERY      ovarian cyst surgery     HYSTEROSCOPY, LAPAROSCOPY, COMBINED  12/1/2011    Procedure:COMBINED HYSTEROSCOPY, LAPAROSCOPY; diagnostic hysterectomy, dilation and curattage, laparoscopy left paraovarian cystectomy.; Surgeon:JASMYNE MICHAEL; Location:[facility]     Social History     Tobacco Use     Smoking status: Never Smoker     Smokeless tobacco: Never Used   Substance Use Topics     Alcohol use: Yes     Alcohol/week: 0.0 oz     Comment: OCCASIONAL     Prior to Admission medications    Medication Sig Start Date End Date Taking? Authorizing Provider   Cholecalciferol (VITAMIN D3 PO) Take 2,000 Units by mouth every morning    Yes Reported, Patient   lisinopril-hydrochlorothiazide (PRINZIDE,ZESTORETIC) 20-12.5 MG per tablet Take 1 tablet by mouth every morning    Yes Reported, Patient   medroxyPROGESTERone (PROVERA) 10 MG tablet Take 1 tablet (10 mg) by mouth daily 3/5/19  Yes Shari Shah MD   Multiple Vitamin (MULTIVITAMIN OR) Take 1 tablet by mouth every morning    Yes Reported, Patient   Omega-3 Fatty Acids (FISH OIL) 1200 MG capsule Take 1,200 mg by mouth every  morning    Yes Reported, Patient   medroxyPROGESTERone (PROVERA) 10 MG tablet Take 1 tablet (10 mg) by mouth 2 times daily for 7 days 3/4/19 3/11/19  Shari Shah MD   medroxyPROGESTERone (PROVERA) 5 MG tablet Take 1 tablet (5 mg) by mouth 2 times daily for 5 days 1/21/19 3/4/19  Shari Shah MD     Current Facility-Administered Medications Ordered in Epic   Medication Dose Route Frequency Last Rate Last Dose     ceFAZolin (ANCEF) 1 g vial to attach to  ml bag for ADULT or 50 ml bag for PEDS  1 g Intravenous See Admin Instructions         ceFAZolin (ANCEF) intermittent infusion 2 g in 100 mL dextrose PRE-MIX  2 g Intravenous Pre-Op/Pre-procedure x 1 dose         No current [facility]-ordered outpatient medications on file.       Wt Readings from Last 1 Encounters:   05/01/19 78.8 kg (173 lb 12.8 oz)     Temp Readings from Last 1 Encounters:   05/01/19 35.7  C (96.2  F) (Oral)     BP Readings from Last 6 Encounters:   05/01/19 149/85   03/04/19 138/80   01/21/19 116/62   01/14/19 136/88   01/08/18 122/70   01/05/17 138/82     Pulse Readings from Last 4 Encounters:   01/21/19 68   01/08/18 68   01/05/17 80   11/30/15 60     Resp Readings from Last 1 Encounters:   05/01/19 16     SpO2 Readings from Last 1 Encounters:   05/01/19 95%     Recent Labs   Lab Test 12/01/11  1000   POTASSIUM 4.3     No results for input(s): AST, ALT, ALKPHOS, BILITOTAL, LIPASE in the last 34052 hours.  No results for input(s): WBC, HGB, PLT in the last 05745 hours.  No results for input(s): ABO, RH in the last 85795 hours.  No results for input(s): INR, PTT in the last 99777 hours.   No results for input(s): TROPI in the last 53550 hours.  No results for input(s): PH, PCO2, PO2, HCO3 in the last 00884 hours.  Recent Labs   Lab Test 12/01/11   HCG negative     No results found for this or any previous visit (from the past 744 hour(s)).    RECENT LABS:   ECG:   ECHO:   Anesthesia Pre-Procedure  Evaluation    Patient: Chantal Lino   MRN: [id number] : 1970          Preoperative Diagnosis: HEAVY MENSTRUAL BLEEDING    Procedure(s):  HYSTERECTOMY, SUPRACERVICAL, LAPAROSCOPIC  LAPAROSCOPIC BILATERAL SALPINGECTOMY  CYSTOSCOPY    Past Medical History:   Diagnosis Date     Hyperlipidemia      Hypertension     On Lisinopril     Intramural leiomyoma of uterus 2019     IUD (intrauterine device) in place 2014    Mirena, for heavy periods     Leiomyoma of uterus     had hysteroscopy, and resection of fibroid     Menorrhagia     had operative hysteroscopy and resection of fibroid     Vitamin D deficiency      Past Surgical History:   Procedure Laterality Date     DILATION AND CURETTAGE      resection of fibroid-operative hysteroscopy, D and c and resection of submucosal fibroid     ENT SURGERY      ear surgery, Ear tubes, tympanoplasty as child     GYN SURGERY      ovarian cyst surgery     HYSTEROSCOPY, LAPAROSCOPY, COMBINED  2011    Procedure:COMBINED HYSTEROSCOPY, LAPAROSCOPY; diagnostic hysterectomy, dilation and curattage, laparoscopy left paraovarian cystectomy.; Surgeon:JASMYNE MICHAEL; Location:[facility]       Anesthesia Evaluation     . Pt has had prior anesthetic.     No history of anesthetic complications          ROS/MED HX    ENT/Pulmonary:      (-) tobacco use and sleep apnea   Neurologic:       Cardiovascular:     (+) hypertension----. : . . . :. .       METS/Exercise Tolerance:     Hematologic:         Musculoskeletal:         GI/Hepatic:        (-) GERD   Renal/Genitourinary:         Endo:         Psychiatric:         Infectious Disease:         Malignancy:         Other:                          Physical Exam  Normal systems: cardiovascular, pulmonary and dental    Airway   Mallampati: I  Neck ROM: full    Dental     Cardiovascular       Pulmonary             Lab Results   Component Value Date    POTASSIUM 4.3 2011    HCG negative 2011       Preop  "Vitals  BP Readings from Last 3 Encounters:   05/01/19 149/85   03/04/19 138/80   01/21/19 116/62    Pulse Readings from Last 3 Encounters:   01/21/19 68   01/08/18 68   01/05/17 80      Resp Readings from Last 3 Encounters:   05/01/19 16   11/16/15 12   12/01/11 16    SpO2 Readings from Last 3 Encounters:   05/01/19 95%   12/01/11 99%      Temp Readings from Last 1 Encounters:   05/01/19 35.7  C (96.2  F) (Oral)    Ht Readings from Last 1 Encounters:   05/01/19 1.651 m (5' 5\")      Wt Readings from Last 1 Encounters:   05/01/19 78.8 kg (173 lb 12.8 oz)    Estimated body mass index is 28.92 kg/m  as calculated from the following:    Height as of this encounter: 1.651 m (5' 5\").    Weight as of this encounter: 78.8 kg (173 lb 12.8 oz).       Anesthesia Plan      History & Physical Review  History and physical reviewed and following examination; no interval change.    ASA Status:  2 .    NPO Status:  > 8 hours    Plan for General and ETT with Intravenous induction. Maintenance will be Balanced.    PONV prophylaxis:  Ondansetron (or other 5HT-3)  Additional equipment: Videolaryngoscope      Postoperative Care  Postoperative pain management:  IV analgesics.      Consents  Anesthetic plan, risks, benefits and alternatives discussed with:  Patient..                 Camilo Oliveros MD  "

## 2019-05-01 NOTE — ANESTHESIA POSTPROCEDURE EVALUATION
Patient: Chantal Lino    Procedure(s):  LAPAROSCOPIC SUPRACERVICAL HYSTERECTOMY, BILATERAL SALPINGECTOMY, DIAGNOSTIC CYSTOSCOPY  LAPAROSCOPIC BILATERAL SALPINGECTOMY  CYSTOSCOPY    Diagnosis:HEAVY MENSTRUAL BLEEDING  Diagnosis Additional Information: No value filed.    Anesthesia Type:  General, ETT    Note:  Anesthesia Post Evaluation    Patient location during evaluation: PACU  Patient participation: Able to fully participate in evaluation  Level of consciousness: awake and alert  Pain management: adequate  Airway patency: patent  Cardiovascular status: acceptable  Respiratory status: acceptable  Hydration status: acceptable  PONV: none and controlled     Anesthetic complications: None          Last vitals:  Vitals:    05/01/19 1020 05/01/19 1030 05/01/19 1108   BP: 127/72 128/80 126/81   Pulse: 77     Resp: 15 16 12   Temp:      SpO2: 97% 96% 97%         Electronically Signed By: Camilo Oliveros MD  May 1, 2019  1:19 PM

## 2019-05-01 NOTE — DISCHARGE INSTRUCTIONS
Same Day Surgery Discharge Instructions for  Sedation and General Anesthesia       It's not unusual to feel dizzy, light-headed or faint for up to 24 hours after surgery or while taking pain medication.  If you have these symptoms: sit for a few minutes before standing and have someone assist you when you get up to walk or use the bathroom.      You should rest and relax for the next 24 hours. We recommend you make arrangements to have an adult stay with you for at least 24 hours after your discharge.  Avoid hazardous and strenuous activity.      DO NOT DRIVE any vehicle or operate mechanical equipment for 24 hours following the end of your surgery.  Even though you may feel normal, your reactions may be affected by the medication you have received.      Do not drink alcoholic beverages for 24 hours following surgery.       Slowly progress to your regular diet as you feel able. It's not unusual to feel nauseated and/or vomit after receiving anesthesia.  If you develop these symptoms, drink clear liquids (apple juice, ginger ale, broth, 7-up, etc. ) until you feel better.  If your nausea and vomiting persists for 24 hours, please notify your surgeon.        All narcotic pain medications, along with inactivity and anesthesia, can cause constipation. Drinking plenty of liquids and increasing fiber intake will help.      For any questions of a medical nature, call your surgeon.      Do not make important decisions for 24 hours.      If you had general anesthesia, you may have a sore throat for a couple of days related to the breathing tube used during surgery.  You may use Cepacol lozenges to help with this discomfort.  If it worsens or if you develop a fever, contact your surgeon.       If you feel your pain is not well managed with the pain medications prescribed by your surgeon, please contact your surgeon's office to let them know so they can address your concerns.         Today you were given 975 mg of Tylenol at  0600. The recommended daily maximum dose is 4000 mg.     Today you were also given Ibuprofen (also known as Advil or Motrin) 600 mg at 10:22 AM.  Your next dose could be taken at 4:30 PM.        While you were at the hospital today you were given a medication called Pyridium.  This is used to treat pain, burning, increased urination, and increased urge to urinate.    Pyridium will most likely darken the color of your urine to an orange or red color. Darkened urine may also cause stains to your underwear, which may or may not be removed by laundering.    Discharge Instructions for Laparoscopic Hysterectomy    Incisional Care  A small amount of drainage from your incisions is normal. You may wear Band Aids until the drainage stops. The day after surgery, if your incisions are dry, remove the Band Aids. Leave the Steri-Strips (small pieces of tape) in place. Let them fall off on their own, or gently remove them after 7 days.  Once your have removed your Band Aids, you may shower as usual. Wash your incisions with mild soap and water. Pat dry.  Don't use oils, powders, or lotions on your incisions.  General Care  Take your medications exactly as directed by your doctor.    You may have vaginal bleeding that can last for several weeks. Use pads only. Don't put anything in your vagina (tampons, douches or have sexual intercourse) until your doctor says it's safe to do so.  Avoid constipation.  Eat fruits, vegetables, and whole grains.  Drink 6-8 glasses of water a day, unless told to do otherwise.  Use a laxative or a mild stool softener if your doctor says it's okay.  Activity  Don't drive while you are still taking narcotic pain medications.  Don t do strenuous activities until the doctor says it's okay.  Walk as often as you feel able.    Pain  Your incisions may be tender or sore. You may also have pain in your upper back or shoulders. This is from the gas used to enlarge your abdomen to allow your doctor to see  inside your pelvis and perform the procedure. This pain usually goes away in a day or two.   When to Call Your Doctor  Call your doctor right away if you have any of the following:  Fever above 100.4 F (38 C) or chills  Vaginal bleeding that soaks more than one sanitary pad per hour  A foul smelling discharge from the vagina  Difficulty urinating  Severe pain   Redness, swelling, or drainage at your incision sites  Follow-Up  Make a follow-up appointment as directed by your surgeon.    **If you have questions or concerns about your procedure,   call Dr Shah at [phone]**

## 2019-05-01 NOTE — BRIEF OP NOTE
[facility]    Brief Operative Note    Pre-operative diagnosis: HEAVY MENSTRUAL BLEEDING. Uterine Leiomyoma  Post-operative diagnosis Same as Preoperative Diagnosis   Procedure: Procedure(s):  LAPAROSCOPIC SUPRACERVICAL HYSTERECTOMY, BILATERAL SALPINGECTOMY, DIAGNOSTIC CYSTOSCOPY  LAPAROSCOPIC BILATERAL SALPINGECTOMY  CYSTOSCOPY  Surgeon: Surgeon(s) and Role:  Panel 1:     * Shari Shah MD - Primary     * Julian Feliciano MD - Assisting  Panel 2:     * Shari Shah MD - Primary  Anesthesia: General   Estimated blood loss: 25ml  Urine Output: 200ml  Specimens:   ID Type Source Tests Collected by Time Destination   A : UTERUS, BILATERAL FALLOPIAN TUBES Tissue Uterus,  Bilateral Fallopian Tubes SURGICAL PATHOLOGY EXAM Shari Shah MD 5/1/2019  9:04 AM      Findings:   Leiomyomatous uterus. Normal bilateral fallopian tubes and ovaries. Bilateral ureteral jets from diagnostic cystoscopy..  Complications: None.    Dictation Reference Number: [id number]

## 2019-05-01 NOTE — OP NOTE
Procedure Date: 2019      PREOPERATIVE DIAGNOSES:  Heavy menstrual bleeding and uterine leiomyoma.      POSTOPERATIVE DIAGNOSES:  Heavy menstrual bleeding and uterine leiomyoma.      PROCEDURES:  Laparoscopic supracervical hysterectomy, bilateral salpingectomy and diagnostic cystoscopy.      PRIMARY SURGEON:  Shari Shah MD      ASSISTANT SURGEON:  Julian Feliciano MD.  Dr. Feliciano was asked to assist due to the complicated nature of the procedure.      ANESTHESIA:  Received general.      ESTIMATED BLOOD LOSS:  25 mL.      URINE OUTPUT:  200 mL.      SPECIMENS:  Uterus and bilateral fallopian tubes.      FINDINGS:  Leiomyomatous uterus, enlarged.  Normal bilateral fallopian tubes and ovaries.  Bilateral ureteral jets on the diagnostic cystoscopy.  No defect in the PneumoLiner bag after morcellation.      INDICATIONS:  The patient is a 48-year-old  0 with a history of heavy menstrual bleeding.  She had had a Mirena intrauterine device, which had been dislodged.  This was then replaced; however, this other device was also dislodged due to heavy bleeding.  She was managed on oral Provera.  She desired definitive treatment and opted for supracervical hysterectomy.      PROCEDURE IN DETAIL:  The patient was taken to the operating room with IV fluids running.  She received Ancef for infection prophylaxis.  She was then placed in the dorsal supine position, received general anesthesia without difficulty.  She was then placed in low lithotomy.  She was then prepped and draped in the usual sterile fashion.  After a final timeout was obtained, a Abreu catheter was placed in the bladder and a bivalve speculum was placed in the vaginal vault.  The cervicovaginal junction was injected with local anesthetic using dilute vasopressin, as well as dilute lidocaine plus epinephrine.  A Ayaz Wheeler uterine manipulator was then placed.  The bivalve speculum was then removed.  With sterile technique maintained,  attention was turned to the abdomen.  The area of her prior scar underneath her umbilicus was injected with local anesthetic and the abdominal cavity was then entered into sharply.  The PneumoLiner trocar was then placed.  Intraperitoneal entry was verified visually.  The patient was then placed in Trendelenburg position and the abdomen was insufflated.  Accessory ports were then placed in the right and left lower quadrants.  The uterus was examined and was noted to be leiomyomatous and enlarged with an intramural leiomyoma, as well as some subserosal posterior leiomyoma.  Bilateral fallopian tubes and ovaries were noted to be normal.  A quick scan of the entry site was noted to be atraumatic.  The liver capsule and the gallbladder were also noted to be normal.  At this point, the left fallopian tube was grasped and was dissected off of the ovary using the Thunderbeat device.  This was then carried down to the level of the cornua.  The left round ligament was then transected and the bladder flap was developed anteriorly towards the right.  The left uteroovarian ligament was then ligated as well.  The left uterines were identified and were coagulated but not transected.      Attention was then turned to the right fallopian tube, which in a similar fashion was transected off of the right ovary.  The right round ligament was also transected and the bladder flap was completed.  The right utero-ovarian was also ligated as well.  At this time, the right uterine vessels were skeletonized and then coagulated and cut.  Attention was then turned back to the left uterine vessels which were further coagulated and cut.  At this point, the uterine isthmus was identified and the uterus was transected off of the cervix.  The cervical canal was then coagulated.  Next, a PneumoLiner bag was placed in the abdomen.  The specimen was placed into the bag and then morcellation was performed.  After morcellation, the bag was noted to be  intact without any defect.  The bilateral ovaries and the cervical stump were noted to be hemostatic prior to morcellation.  The trocars were then removed.  The fascia was reapproximated using 0 Vicryl suture in the infraumbilical incision sites.  The skin was then reapproximated using 4-0 Monocryl.  Attention was then turned to the vulva.  The Abreu catheter was removed and the cystoscope was introduced.  Bilateral ureteral jets were seen from bilateral ureteral openings.  The bladder dome was noted to be intact.  Therefore, the cystoscope was then removed and the procedure was completed.  At the end of the procedure, all instrument counts were noted to be correct.  The patient was taken to the recovery room in stable condition.         SHAAN CANTRELL MD             D: 2019   T: 2019   MT: SIVA      Name:     COOPER HERRERA   MRN:      [id number]        Account:        [id number]   :      1970           Procedure Date: 2019      Document: [id number]

## 2019-05-01 NOTE — OR NURSING
Taken to PHASE II, vital signs stable, tolerating 600 mg Ibuprofen, crackers and water without nausea.  Pharmacy called.  They are working on prescriptions.

## 2019-05-01 NOTE — ANESTHESIA CARE TRANSFER NOTE
Patient: Chantal Lino    Procedure(s):  LAPAROSCOPIC SUPRACERVICAL HYSTERECTOMY, BILATERAL SALPINGECTOMY, DIAGNOSTIC CYSTOSCOPY  LAPAROSCOPIC BILATERAL SALPINGECTOMY  CYSTOSCOPY    Diagnosis: HEAVY MENSTRUAL BLEEDING  Diagnosis Additional Information: No value filed.    Anesthesia Type:   General, ETT     Note:  Airway :Face Mask and Oral Airway  Patient transferred to:PACU  Handoff Report: Identifed the Patient, Identified the Reponsible Provider, Reviewed the pertinent medical history, Discussed the surgical course, Reviewed Intra-OP anesthesia mangement and issues during anesthesia, Set expectations for post-procedure period and Allowed opportunity for questions and acknowledgement of understanding      Vitals: (Last set prior to Anesthesia Care Transfer)    CRNA VITALS  5/1/2019 0903 - 5/1/2019 0938      5/1/2019             Pulse:  88    SpO2:  100 %    Resp Rate (observed):  9                Electronically Signed By: JANIE Schwartz CRNA  May 1, 2019  9:38 AM

## 2019-05-02 LAB — COPATH REPORT: NORMAL

## 2019-05-23 NOTE — PROGRESS NOTES
SUBJECTIVE:                                                   Chantal Lino is a 48 year old female who presents to clinic today for the following health issue(s):  Patient presents with:  Post-op Visit: LAPAROSCOPIC SUPRACERVICAL HYSTERECTOMY, BILATERAL SALPINGECTOMY, DIAGNOSTIC CYSTOSCOPY      HPI:  Doing well. Not using pain medication. No bleeding.     No LMP recorded (lmp unknown). Patient has had a hysterectomy..   Patient is not sexually active, .  Using hysterectomy for contraception.    reports that she has never smoked. She has never used smokeless tobacco.    STD testing offered?  Declined    Health maintenance updated:  yes    Today's PHQ-2 Score: No flowsheet data found.  Today's PHQ-9 Score:   PHQ-9 SCORE 2019   PHQ-9 Total Score 0     Today's RICHARD-7 Score:   RICHARD-7 SCORE 2019   Total Score 0       Problem list and histories reviewed & adjusted, as indicated.  Additional history: as documented.    Patient Active Problem List   Diagnosis     IUD (intrauterine device) in place     Hypertension     Intramural leiomyoma of uterus     Past Surgical History:   Procedure Laterality Date     CYSTOSCOPY N/A 2019    Procedure: CYSTOSCOPY;  Surgeon: Shari Shah MD;  Location:  OR     DILATION AND CURETTAGE      resection of fibroid-operative hysteroscopy, D and c and resection of submucosal fibroid     ENT SURGERY      ear surgery, Ear tubes, tympanoplasty as child     GYN SURGERY      ovarian cyst surgery     HYSTEROSCOPY, LAPAROSCOPY, COMBINED  2011    Procedure:COMBINED HYSTEROSCOPY, LAPAROSCOPY; diagnostic hysterectomy, dilation and curattage, laparoscopy left paraovarian cystectomy.; Surgeon:JASMYNE MICHAEL; Location:[facility]     LAPAROSCOPIC HYSTERECTOMY SUPRACERVICAL N/A 2019    Procedure: LAPAROSCOPIC SUPRACERVICAL HYSTERECTOMY, BILATERAL SALPINGECTOMY, DIAGNOSTIC CYSTOSCOPY;  Surgeon: Shari Shah MD;  Location:  OR      "LAPAROSCOPIC SALPINGECTOMY Bilateral 2019    Procedure: LAPAROSCOPIC BILATERAL SALPINGECTOMY;  Surgeon: Shari Shah MD;  Location:  OR      Social History     Tobacco Use     Smoking status: Never Smoker     Smokeless tobacco: Never Used   Substance Use Topics     Alcohol use: Yes     Alcohol/week: 0.0 oz     Comment: OCCASIONAL      Problem (# of Occurrences) Relation (Name,Age of Onset)    Cerebrovascular Disease (3) Other: aunt, Father:  of stroke-unspecified if father, aunt or PGF, Paternal Grandfather    Colon Cancer (1) Maternal Grandmother    Colon Polyps (1) Mother    Diabetes (1) Maternal Grandmother    Heart Disease (1) Sister (34): sister had heart attack at age 34    Hyperlipidemia (1) Other: not specified    Hypertension (1) Other: not specified    Osteoporosis (1) Mother            Current Outpatient Medications   Medication Sig     Cholecalciferol (VITAMIN D3 PO) Take 2,000 Units by mouth every morning      lisinopril-hydrochlorothiazide (PRINZIDE,ZESTORETIC) 20-12.5 MG per tablet Take 1 tablet by mouth every morning      Multiple Vitamin (MULTIVITAMIN OR) Take 1 tablet by mouth every morning      Omega-3 Fatty Acids (FISH OIL) 1200 MG capsule Take 1,200 mg by mouth every morning      No current facility-administered medications for this visit.      No Known Allergies    ROS:  12 point review of systems negative other than symptoms noted below.    OBJECTIVE:     /84   Ht 1.651 m (5' 5\")   Wt 78.6 kg (173 lb 3.2 oz)   LMP  (LMP Unknown)   BMI 28.82 kg/m    Body mass index is 28.82 kg/m .    Exam:  Constitutional:  Appearance: Well nourished, well developed alert, in no acute distress  Chest:  Respiratory Effort:  Breathing unlabored  Gastrointestinal:  Abdominal Examination:  Incision sites are well healed. Abdomen nontender to palpation, tone normal without rigidity or guarding, no masses present, umbilicus without lesions; Liver/Spleen:  No hepatomegaly present, " liver nontender to palpation; Hernias:  No hernias present  Skin:General Inspection:  No rashes present, no lesions present, no areas of discoloration; Genitalia and Groin:  No rashes present, no lesions present, no areas of discoloration, no masses present.  Neurologic/Psychiatric:  Mental Status:  Oriented X3   Cervix: no lesions, no discharge      In-Clinic Test Results:  No results found for this or any previous visit (from the past 24 hour(s)).    ASSESSMENT/PLAN:                                                        ICD-10-CM    1. S/P abdominal supracervical subtotal hysterectomy Z90.711      Doing well after LASH. No concern for post-operative complication. Reassured that she is not at increased risk for cervical cancer.  RTC in 1 year for annual exam.    Shari Shah MD  [facility]

## 2019-05-31 ENCOUNTER — OFFICE VISIT (OUTPATIENT)
Dept: OBGYN | Facility: CLINIC | Age: 49
End: 2019-05-31
Payer: COMMERCIAL

## 2019-05-31 VITALS
HEIGHT: 65 IN | SYSTOLIC BLOOD PRESSURE: 124 MMHG | WEIGHT: 173.2 LBS | BODY MASS INDEX: 28.86 KG/M2 | DIASTOLIC BLOOD PRESSURE: 84 MMHG

## 2019-05-31 DIAGNOSIS — Z90.711 S/P ABDOMINAL SUPRACERVICAL SUBTOTAL HYSTERECTOMY: Primary | ICD-10-CM

## 2019-05-31 PROCEDURE — 99024 POSTOP FOLLOW-UP VISIT: CPT | Performed by: OBSTETRICS & GYNECOLOGY

## 2019-05-31 ASSESSMENT — MIFFLIN-ST. JEOR: SCORE: 1426.51

## 2020-01-20 NOTE — PROGRESS NOTES
Chantal is a 49 year old  female who presents for annual exam.     Besides routine health maintenance, she has no other health concerns today .    HPI:  Chantal presents for an annual exam. She is doing well and happy with her hysterectomy. She follows up routinely with her PCP. She has a mammogram scheduled today.     The patient's PCP is Dr Mateusz Juarez MD.      GYNECOLOGIC HISTORY:    No LMP recorded (lmp unknown). Patient has had a hysterectomy.  She  reports that she has never smoked. She has never used smokeless tobacco.  Patient is not sexually active.  STD testing offered?  Declined    Last PHQ-9 score on record =   PHQ-9 SCORE 2020   PHQ-9 Total Score 0     Last GAD7 score on record =   RICHARD-7 SCORE 2020   Total Score 0     Alcohol Score = 1    HEALTH MAINTENANCE:  Cholesterol: followed by primary care provider, found in Care Everywhere 19  Last Mammo: 19, Result: Normal, Next Mammo: Today   Pap:   Lab Results   Component Value Date    PAP NIL, NEG-HPV 2017   Colonoscopy:  N/A, Result: not applicable, Next Colonoscopy: screen age 50  Dexa:  N/A  Health maintenance updated:  yes    HISTORY:  OB History    Para Term  AB Living   0 0 0 0 0 0   SAB TAB Ectopic Multiple Live Births   0 0 0 0 0       Patient Active Problem List   Diagnosis     IUD (intrauterine device) in place     Hypertension     Intramural leiomyoma of uterus     Past Surgical History:   Procedure Laterality Date     CYSTOSCOPY N/A 2019    Procedure: CYSTOSCOPY;  Surgeon: Shari Shah MD;  Location: [facility] OR     DILATION AND CURETTAGE      resection of fibroid-operative hysteroscopy, D and c and resection of submucosal fibroid     ENT SURGERY      ear surgery, Ear tubes, tympanoplasty as child     GYN SURGERY      ovarian cyst surgery     HYSTEROSCOPY, LAPAROSCOPY, COMBINED  2011    Procedure:COMBINED HYSTEROSCOPY, LAPAROSCOPY; diagnostic hysterectomy, dilation and  curattage, laparoscopy left paraovarian cystectomy.; Surgeon:JASMYNE MICHAEL; Location: [facility]     LAPAROSCOPIC HYSTERECTOMY SUPRACERVICAL N/A 2019    Procedure: LAPAROSCOPIC SUPRACERVICAL HYSTERECTOMY, BILATERAL SALPINGECTOMY, DIAGNOSTIC CYSTOSCOPY;  Surgeon: Shari Shah MD;  Location:  OR     LAPAROSCOPIC SALPINGECTOMY Bilateral 2019    Procedure: LAPAROSCOPIC BILATERAL SALPINGECTOMY;  Surgeon: Shari Shah MD;  Location:  OR      Social History     Tobacco Use     Smoking status: Never Smoker     Smokeless tobacco: Never Used   Substance Use Topics     Alcohol use: Yes     Alcohol/week: 0.0 standard drinks     Comment: OCCASIONAL      Problem (# of Occurrences) Relation (Name,Age of Onset)    Cerebrovascular Disease (3) Other: aunt, Father:  of stroke-unspecified if father, aunt or PGF, Paternal Grandfather    Colon Cancer (1) Maternal Grandmother    Colon Polyps (1) Mother    Diabetes (1) Maternal Grandmother    Heart Disease (1) Sister (34): sister had heart attack at age 34    Hyperlipidemia (1) Other: not specified    Hypertension (1) Other: not specified    Osteoporosis (1) Mother            Current Outpatient Medications   Medication Sig     atorvastatin (LIPITOR) 20 MG tablet      Cholecalciferol (VITAMIN D3 PO) Take 2,000 Units by mouth every morning      lisinopril-hydrochlorothiazide (PRINZIDE,ZESTORETIC) 20-12.5 MG per tablet Take 1 tablet by mouth every morning      Multiple Vitamin (MULTIVITAMIN OR) Take 1 tablet by mouth every morning      Omega-3 Fatty Acids (FISH OIL) 1200 MG capsule Take 1,200 mg by mouth every morning      No current facility-administered medications for this visit.      No Known Allergies    Past medical, surgical, social and family histories were reviewed and updated in EPIC.    ROS:   12 point review of systems negative other than symptoms noted below or in the HPI.  No urinary frequency or dysuria, bladder or kidney  "problems    EXAM:  /72   Ht 1.657 m (5' 5.25\")   Wt 80.7 kg (178 lb)   LMP  (LMP Unknown)   BMI 29.39 kg/m     BMI: Body mass index is 29.39 kg/m .    PHYSICAL EXAM:  Constitutional:   Appearance: Well nourished, well developed, alert, in no acute distress  Neck:  Lymph Nodes:  No lymphadenopathy present    Thyroid:  Gland size normal, nontender, no nodules or masses present  on palpation  Chest:  Respiratory Effort:  Breathing unlabored  Cardiovascular:    Heart: Auscultation:  Regular rate, normal rhythm, no murmurs present  Breasts: Palpation of Breasts and Axillae:  No masses present on palpation, no breast tenderness., Axillary Lymph Nodes:  No lymphadenopathy present. and No nodularity, asymmetry or nipple discharge bilaterally.  Gastrointestinal:   Abdominal Examination:  Abdomen nontender to palpation, tone normal without rigidity or guarding, no masses present, umbilicus without lesions   Liver and Spleen:  No hepatomegaly present, liver nontender to palpation    Hernias:  No hernias present  Lymphatic: Lymph Nodes:  No other lymphadenopathy present  Skin:  General Inspection:  No rashes present, no lesions present, no areas of  discoloration  Neurologic:    Mental Status:  Oriented X3.  Normal strength and tone, sensory exam                grossly normal, mentation intact and speech normal.    Psychiatric:   Mentation appears normal and affect normal/bright.         Pelvic Exam:  External Genitalia:     Normal appearance for age, no discharge present, no tenderness present, no inflammatory lesions present, color normal  Vagina:     Normal vaginal vault without central or paravaginal defects, no discharge present, no inflammatory lesions present, no masses present  Bladder:     Nontender to palpation  Urethra:   Urethral Body:  Urethra palpation normal, urethra structural support normal   Urethral Meatus:  No erythema or lesions present  Cervix:     Appearance healthy, no lesions present, " nontender to palpation, no bleeding present  Uterus:     Surgically absent  Adnexa:     No adnexal tenderness present, no adnexal masses present  Perineum:     Perineum within normal limits, no evidence of trauma, no rashes or skin lesions present  Anus:     Anus within normal limits, no hemorrhoids present  Inguinal Lymph Nodes:     No lymphadenopathy present  Pubic Hair:     Normal pubic hair distribution for age  Genitalia and Groin:     No rashes present, no lesions present, no areas of discoloration, no masses present      COUNSELING:   Reviewed preventive health counseling, as reflected in patient instructions       Regular exercise    BMI: Body mass index is 29.39 kg/m .      ASSESSMENT:  49 year old female with satisfactory annual exam.    ICD-10-CM    1. Encounter for gynecological examination without abnormal finding Z01.419    2. Screening for cervical cancer Z12.4 HPV High Risk Types DNA Cervical     Pap imaged thin layer screen with HPV - recommended age 30 - 65       PLAN:  Pap smear was performed today.  Mammogram was done as well.  Screening labs via her PCP. Management of her hypertension via her PCP as well. I recommended having TSH checked given her age and elevated cholesterol.     Shari Shah MD

## 2020-01-21 ENCOUNTER — OFFICE VISIT (OUTPATIENT)
Dept: OBGYN | Facility: CLINIC | Age: 50
End: 2020-01-21
Attending: OBSTETRICS & GYNECOLOGY
Payer: COMMERCIAL

## 2020-01-21 ENCOUNTER — ANCILLARY PROCEDURE (OUTPATIENT)
Dept: MAMMOGRAPHY | Facility: CLINIC | Age: 50
End: 2020-01-21
Attending: OBSTETRICS & GYNECOLOGY
Payer: COMMERCIAL

## 2020-01-21 VITALS
WEIGHT: 178 LBS | HEIGHT: 65 IN | DIASTOLIC BLOOD PRESSURE: 72 MMHG | BODY MASS INDEX: 29.66 KG/M2 | SYSTOLIC BLOOD PRESSURE: 118 MMHG

## 2020-01-21 DIAGNOSIS — Z12.4 SCREENING FOR CERVICAL CANCER: ICD-10-CM

## 2020-01-21 DIAGNOSIS — Z01.419 ENCOUNTER FOR GYNECOLOGICAL EXAMINATION WITHOUT ABNORMAL FINDING: Primary | ICD-10-CM

## 2020-01-21 DIAGNOSIS — Z12.31 VISIT FOR SCREENING MAMMOGRAM: ICD-10-CM

## 2020-01-21 PROCEDURE — 77063 BREAST TOMOSYNTHESIS BI: CPT | Mod: TC

## 2020-01-21 PROCEDURE — G0145 SCR C/V CYTO,THINLAYER,RESCR: HCPCS | Performed by: OBSTETRICS & GYNECOLOGY

## 2020-01-21 PROCEDURE — 99396 PREV VISIT EST AGE 40-64: CPT | Performed by: OBSTETRICS & GYNECOLOGY

## 2020-01-21 PROCEDURE — 77067 SCR MAMMO BI INCL CAD: CPT | Mod: TC

## 2020-01-21 PROCEDURE — 87624 HPV HI-RISK TYP POOLED RSLT: CPT | Performed by: OBSTETRICS & GYNECOLOGY

## 2020-01-21 RX ORDER — ATORVASTATIN CALCIUM 20 MG/1
TABLET, FILM COATED ORAL
COMMUNITY
Start: 2019-12-10

## 2020-01-21 ASSESSMENT — ANXIETY QUESTIONNAIRES
GAD7 TOTAL SCORE: 0
2. NOT BEING ABLE TO STOP OR CONTROL WORRYING: NOT AT ALL
1. FEELING NERVOUS, ANXIOUS, OR ON EDGE: NOT AT ALL
3. WORRYING TOO MUCH ABOUT DIFFERENT THINGS: NOT AT ALL
5. BEING SO RESTLESS THAT IT IS HARD TO SIT STILL: NOT AT ALL
6. BECOMING EASILY ANNOYED OR IRRITABLE: NOT AT ALL
IF YOU CHECKED OFF ANY PROBLEMS ON THIS QUESTIONNAIRE, HOW DIFFICULT HAVE THESE PROBLEMS MADE IT FOR YOU TO DO YOUR WORK, TAKE CARE OF THINGS AT HOME, OR GET ALONG WITH OTHER PEOPLE: NOT DIFFICULT AT ALL
7. FEELING AFRAID AS IF SOMETHING AWFUL MIGHT HAPPEN: NOT AT ALL

## 2020-01-21 ASSESSMENT — PATIENT HEALTH QUESTIONNAIRE - PHQ9
5. POOR APPETITE OR OVEREATING: NOT AT ALL
SUM OF ALL RESPONSES TO PHQ QUESTIONS 1-9: 0

## 2020-01-21 ASSESSMENT — MIFFLIN-ST. JEOR: SCORE: 1447.24

## 2020-01-22 ASSESSMENT — ANXIETY QUESTIONNAIRES: GAD7 TOTAL SCORE: 0

## 2020-01-27 LAB
COPATH REPORT: NORMAL
PAP: NORMAL

## 2020-01-28 LAB
FINAL DIAGNOSIS: NORMAL
HPV HR 12 DNA CVX QL NAA+PROBE: NEGATIVE
HPV16 DNA SPEC QL NAA+PROBE: NEGATIVE
HPV18 DNA SPEC QL NAA+PROBE: NEGATIVE
SPECIMEN DESCRIPTION: NORMAL
SPECIMEN SOURCE CVX/VAG CYTO: NORMAL

## 2021-01-29 NOTE — PROGRESS NOTES
Chantal is a 50 year old  female who presents for annual exam.     Besides routine health maintenance,  she would like recommendation  Of a female PCP. Has been having some acid reflux issues for about 2 months    HPI:  The patient's PCP is  Mateusz Juarez MD. Doing well. Having GERD and thinks it may be related to stress. No vaginal bleeding or pelvic floor complaints. Mammogram scheduled today. Working on site at work.       GYNECOLOGIC HISTORY:    No LMP recorded (lmp unknown). Patient has had a hysterectomy.    Her current contraception method is: hysterectomy.  She  reports that she has never smoked. She has never used smokeless tobacco.    Patient is not sexually active.  STD testing offered?  Declined  Last PHQ-9 score on record =   PHQ-9 SCORE 2021   PHQ-9 Total Score 0     Last GAD7 score on record =   RICHARD-7 SCORE 2021   Total Score 0     Alcohol Score = 0    HEALTH MAINTENANCE:  Cholesterol: 2019 Care Everywhere [facility]   Total= 138, Triglycerides=187, HDL=31, LDL=70  Last Mammo: 20, Result: Normal, Next Mammo: Today   Pap:   Lab Results   Component Value Date    PAP NIL 2020    PAP NIL 2017      Colonoscopy:  none, Result: Not applicable, Next Colonoscopy: age 45-50 years.  Dexa:  none    Health maintenance updated:  yes    HISTORY:  OB History    Para Term  AB Living   0 0 0 0 0 0   SAB TAB Ectopic Multiple Live Births   0 0 0 0 0       Patient Active Problem List   Diagnosis     IUD (intrauterine device) in place     Hypertension     Intramural leiomyoma of uterus     Past Surgical History:   Procedure Laterality Date     CYSTOSCOPY N/A 2019    Procedure: CYSTOSCOPY;  Surgeon: Shari Shah MD;  Location: [facility] OR     DILATION AND CURETTAGE      resection of fibroid-operative hysteroscopy, D and c and resection of submucosal fibroid     ENT SURGERY      ear surgery, Ear tubes, tympanoplasty as child     GYN SURGERY      ovarian  cyst surgery     HYSTEROSCOPY, LAPAROSCOPY, COMBINED  2011    Procedure:COMBINED HYSTEROSCOPY, LAPAROSCOPY; diagnostic hysterectomy, dilation and curattage, laparoscopy left paraovarian cystectomy.; Surgeon:JASMYNE MICHAEL; Location: [facility]     LAPAROSCOPIC HYSTERECTOMY SUPRACERVICAL N/A 2019    Procedure: LAPAROSCOPIC SUPRACERVICAL HYSTERECTOMY, BILATERAL SALPINGECTOMY, DIAGNOSTIC CYSTOSCOPY;  Surgeon: Shari Shah MD;  Location:  OR     LAPAROSCOPIC SALPINGECTOMY Bilateral 2019    Procedure: LAPAROSCOPIC BILATERAL SALPINGECTOMY;  Surgeon: Shari Shah MD;  Location:  OR      Social History     Tobacco Use     Smoking status: Never Smoker     Smokeless tobacco: Never Used   Substance Use Topics     Alcohol use: Yes     Alcohol/week: 0.0 standard drinks     Comment: OCCASIONAL      Problem (# of Occurrences) Relation (Name,Age of Onset)    Cerebrovascular Disease (3) Other: aunt, Father:  of stroke-unspecified if father, aunt or PGF, Paternal Grandfather    Colon Cancer (1) Maternal Grandmother    Colon Polyps (1) Mother    Diabetes (1) Maternal Grandmother    Heart Disease (1) Sister (34): sister had heart attack at age 34    Hyperlipidemia (1) Other: not specified    Hypertension (1) Other: not specified    Osteoporosis (1) Mother            Current Outpatient Medications   Medication Sig     atorvastatin (LIPITOR) 20 MG tablet      Cholecalciferol (VITAMIN D3 PO) Take 2,000 Units by mouth every morning      lisinopril-hydrochlorothiazide (PRINZIDE,ZESTORETIC) 20-12.5 MG per tablet Take 1 tablet by mouth every morning      Multiple Vitamin (MULTIVITAMIN OR) Take 1 tablet by mouth every morning      Omega-3 Fatty Acids (FISH OIL) 1200 MG capsule Take 1,200 mg by mouth every morning      No current facility-administered medications for this visit.      No Known Allergies    Past medical, surgical, social and family histories were reviewed and updated in  "EPIC.    ROS:   12 point review of systems negative other than symptoms noted below or in the HPI.  No urinary frequency or dysuria, bladder or kidney problems    EXAM:  /78   Ht 1.664 m (5' 5.5\")   Wt 81.5 kg (179 lb 9.6 oz)   LMP  (LMP Unknown)   Breastfeeding No   BMI 29.43 kg/m     BMI: Body mass index is 29.43 kg/m .    PHYSICAL EXAM:  Constitutional:   Appearance: Well nourished, well developed, alert, in no acute distress  Neck:  Lymph Nodes:  No lymphadenopathy present    Thyroid:  Gland size normal, nontender, no nodules or masses present on palpation  Chest:  Respiratory Effort:  Breathing unlabored, CTAB  Cardiovascular:    Heart: Auscultation:  Regular rate, normal rhythm, no murmurs present  Breasts: Inspection of Breasts:  No lymphadenopathy present., Palpation of Breasts and Axillae:  No masses present on palpation, no breast tenderness., Axillary Lymph Nodes:  No lymphadenopathy present. and No nodularity, asymmetry or nipple discharge bilaterally.  Gastrointestinal:   Abdominal Examination:  Abdomen nontender to palpation, tone normal without rigidity or guarding, no masses present, umbilicus without lesions   Liver and Spleen:  No hepatomegaly present, liver nontender to palpation    Hernias:  No hernias present  Lymphatic: Lymph Nodes:  No other lymphadenopathy present  Skin:  General Inspection:  No rashes present, no lesions present, no areas of  discoloration  Neurologic:    Mental Status:  Oriented X3.  Normal strength and tone, sensory exam                grossly normal, mentation intact and speech normal.    Psychiatric:   Mentation appears normal and affect normal/bright.         Pelvic Exam:  External Genitalia:     Normal appearance for age, no discharge present, no tenderness present, no inflammatory lesions present, color normal  Vagina:     Normal vaginal vault without central or paravaginal defects, no discharge present, no inflammatory lesions present, no masses " present  Bladder:     Nontender to palpation  Urethra:   Urethral Body:  Urethra palpation normal, urethra structural support normal   Urethral Meatus:  No erythema or lesions present  Cervix:     Appearance healthy, no lesions present, nontender to palpation, no bleeding present  Uterus:     Surgically absent  Adnexa:     No adnexal tenderness present, no adnexal masses present  Perineum:     Perineum within normal limits, no evidence of trauma, no rashes or skin lesions present  Anus:     Anus within normal limits, no hemorrhoids present  Inguinal Lymph Nodes:     No lymphadenopathy present  Pubic Hair:     Normal pubic hair distribution for age  Genitalia and Groin:     No rashes present, no lesions present, no areas of discoloration, no masses present      COUNSELING:   Reviewed preventive health counseling, as reflected in patient instructions       Regular exercise    BMI: Body mass index is 29.43 kg/m .      ASSESSMENT:  50 year old female with satisfactory annual exam.    ICD-10-CM    1. Encounter for gynecological examination without abnormal finding  Z01.419        PLAN:  Mammogram today  PCP referral given  Declines flu vaccine.  Pap smear screening up to date.    Shari Shah MD

## 2021-02-01 ENCOUNTER — ANCILLARY PROCEDURE (OUTPATIENT)
Dept: MAMMOGRAPHY | Facility: CLINIC | Age: 51
End: 2021-02-01
Payer: COMMERCIAL

## 2021-02-01 ENCOUNTER — OFFICE VISIT (OUTPATIENT)
Dept: OBGYN | Facility: CLINIC | Age: 51
End: 2021-02-01
Payer: COMMERCIAL

## 2021-02-01 VITALS
SYSTOLIC BLOOD PRESSURE: 122 MMHG | BODY MASS INDEX: 28.87 KG/M2 | HEIGHT: 66 IN | DIASTOLIC BLOOD PRESSURE: 78 MMHG | WEIGHT: 179.6 LBS

## 2021-02-01 DIAGNOSIS — Z12.31 VISIT FOR SCREENING MAMMOGRAM: ICD-10-CM

## 2021-02-01 DIAGNOSIS — Z01.419 ENCOUNTER FOR GYNECOLOGICAL EXAMINATION WITHOUT ABNORMAL FINDING: Primary | ICD-10-CM

## 2021-02-01 PROCEDURE — 99396 PREV VISIT EST AGE 40-64: CPT | Performed by: OBSTETRICS & GYNECOLOGY

## 2021-02-01 PROCEDURE — 77063 BREAST TOMOSYNTHESIS BI: CPT | Mod: TC | Performed by: RADIOLOGY

## 2021-02-01 PROCEDURE — 77067 SCR MAMMO BI INCL CAD: CPT | Mod: TC | Performed by: RADIOLOGY

## 2021-02-01 ASSESSMENT — ANXIETY QUESTIONNAIRES
3. WORRYING TOO MUCH ABOUT DIFFERENT THINGS: NOT AT ALL
6. BECOMING EASILY ANNOYED OR IRRITABLE: NOT AT ALL
2. NOT BEING ABLE TO STOP OR CONTROL WORRYING: NOT AT ALL
GAD7 TOTAL SCORE: 0
IF YOU CHECKED OFF ANY PROBLEMS ON THIS QUESTIONNAIRE, HOW DIFFICULT HAVE THESE PROBLEMS MADE IT FOR YOU TO DO YOUR WORK, TAKE CARE OF THINGS AT HOME, OR GET ALONG WITH OTHER PEOPLE: NOT DIFFICULT AT ALL
1. FEELING NERVOUS, ANXIOUS, OR ON EDGE: NOT AT ALL
5. BEING SO RESTLESS THAT IT IS HARD TO SIT STILL: NOT AT ALL
7. FEELING AFRAID AS IF SOMETHING AWFUL MIGHT HAPPEN: NOT AT ALL

## 2021-02-01 ASSESSMENT — PATIENT HEALTH QUESTIONNAIRE - PHQ9
5. POOR APPETITE OR OVEREATING: NOT AT ALL
SUM OF ALL RESPONSES TO PHQ QUESTIONS 1-9: 0

## 2021-02-01 ASSESSMENT — MIFFLIN-ST. JEOR: SCORE: 1453.47

## 2021-02-02 ASSESSMENT — ANXIETY QUESTIONNAIRES: GAD7 TOTAL SCORE: 0

## 2022-02-01 NOTE — PROGRESS NOTES
Chantal is a 51 year old  female who presents for annual exam.     Besides routine health maintenance, she has no other health concerns today .    HPI:  The patient's PCP is Dr. Mateusz Juarez MD. patient here today for her annual GYN exam and mammogram.  She typically sees Dr. Shah.   She is up-to-date on her Pap smear.  She is status post las BS and has no menopausal symptoms whatsoever.  She is not on any hormone replacement therapy.  She sees her primary care provider on a routine basis.  She will be due for colonoscopy at the end of the year.      GYNECOLOGIC HISTORY:    No LMP recorded (lmp unknown). Patient has had a hysterectomy.    Her current contraception method is: hysterectomy.  She  reports that she has never smoked. She has never used smokeless tobacco.    Patient is not sexually active.  STD testing offered?  Declined     Last PHQ-9 score on record =   PHQ-9 SCORE 2021   PHQ-9 Total Score 0     Last GAD7 score on record =   RICHARD-7 SCORE 2021   Total Score 0     Alcohol Score = 1    HEALTH MAINTENANCE:  Cholesterol: 21 @ [facility]   Total=120, Triglycerides=121, HDL=29, LDL=67.  Last Mammo: One year ago, Result: Normal, Next Mammo: Today   Pap:   Lab Results   Component Value Date    PAP NIL NEG-HPV 2020    PAP NIL 2017     Colonoscopy: N/A, Next Colonoscopy: Due at age 47.  Dexa:  N/A    Health maintenance updated:  no, due for colonoscopy.    HISTORY:  OB History    Para Term  AB Living   0 0 0 0 0 0   SAB IAB Ectopic Multiple Live Births   0 0 0 0 0       Patient Active Problem List   Diagnosis     Hypertension     Intramural leiomyoma of uterus     Past Surgical History:   Procedure Laterality Date     CYSTOSCOPY N/A 2019    Procedure: CYSTOSCOPY;  Surgeon: Shari Shah MD;  Location: [facility]     DILATION AND CURETTAGE      resection of fibroid-operative hysteroscopy, D and c and resection of submucosal fibroid     ENT  SURGERY      ear surgery, Ear tubes, tympanoplasty as child     GYN SURGERY      ovarian cyst surgery     HYSTEROSCOPY, LAPAROSCOPY, COMBINED  2011    Procedure:COMBINED HYSTEROSCOPY, LAPAROSCOPY; diagnostic hysterectomy, dilation and curattage, laparoscopy left paraovarian cystectomy.; Surgeon:JASMYNE MICHAEL; Location: [facility]     LAPAROSCOPIC HYSTERECTOMY SUPRACERVICAL N/A 2019    Procedure: LAPAROSCOPIC SUPRACERVICAL HYSTERECTOMY, BILATERAL SALPINGECTOMY, DIAGNOSTIC CYSTOSCOPY;  Surgeon: Shari Shah MD;  Location:  OR     LAPAROSCOPIC SALPINGECTOMY Bilateral 2019    Procedure: LAPAROSCOPIC BILATERAL SALPINGECTOMY;  Surgeon: Shari Shah MD;  Location:  OR      Social History     Tobacco Use     Smoking status: Never Smoker     Smokeless tobacco: Never Used   Substance Use Topics     Alcohol use: Yes     Alcohol/week: 0.0 standard drinks     Comment: OCCASIONAL      Problem (# of Occurrences) Relation (Name,Age of Onset)    Cerebrovascular Disease (3) Other: aunt, Father:  of stroke-unspecified if father, aunt or PGF, Paternal Grandfather    Colon Cancer (1) Maternal Grandmother    Colon Polyps (1) Mother    Diabetes (1) Maternal Grandmother    Heart Disease (1) Sister (34): sister had heart attack at age 34    Hyperlipidemia (1) Other: not specified    Hypertension (1) Other: not specified    Osteoporosis (1) Mother            Current Outpatient Medications   Medication Sig     atorvastatin (LIPITOR) 20 MG tablet      Cholecalciferol (VITAMIN D3 PO) Take 2,000 Units by mouth every morning      lisinopril-hydrochlorothiazide (PRINZIDE,ZESTORETIC) 20-12.5 MG per tablet Take 1 tablet by mouth every morning      Multiple Vitamin (MULTIVITAMIN OR) Take 1 tablet by mouth every morning      Omega-3 Fatty Acids (FISH OIL) 1200 MG capsule Take 1,200 mg by mouth every morning      No current facility-administered medications for this visit.     No Known  "Allergies    Past medical, surgical, social and family histories were reviewed and updated in EPIC.    ROS:   12 point review of systems negative other than symptoms noted below or in the HPI.  No urinary frequency or dysuria, bladder or kidney problems    EXAM:  /88   Ht 1.664 m (5' 5.5\")   Wt 79.8 kg (176 lb)   LMP  (LMP Unknown)   BMI 28.84 kg/m     BMI: Body mass index is 28.84 kg/m .    PHYSICAL EXAM:  Constitutional:   Appearance: Well nourished, well developed, alert, in no acute distress  Neck:  Lymph Nodes:  No lymphadenopathy present    Thyroid:  Gland size normal, nontender, no nodules or masses present  on palpation  Chest:  Respiratory Effort:  Breathing unlabored  Cardiovascular:    Heart: Auscultation:  Regular rate, normal rhythm, no murmurs present  Breasts: Inspection of Breasts:  No lymphadenopathy present., Palpation of Breasts and Axillae:  No masses present on palpation, no breast tenderness., Axillary Lymph Nodes:  No lymphadenopathy present. and No nodularity, asymmetry or nipple discharge bilaterally.  Gastrointestinal:   Abdominal Examination:  Abdomen nontender to palpation, tone normal without rigidity or guarding, no masses present, umbilicus without lesions   Liver and Spleen:  No hepatomegaly present, liver nontender to palpation    Hernias:  No hernias present  Lymphatic: Lymph Nodes:  No other lymphadenopathy present  Skin:  General Inspection:  No rashes present, no lesions present, no areas of  discoloration  Neurologic:    Mental Status:  Oriented X3.  Normal strength and tone, sensory exam                grossly normal, mentation intact and speech normal.    Psychiatric:   Mentation appears normal and affect normal/bright.         Pelvic Exam:  External Genitalia:     Normal appearance for age, no discharge present, no tenderness present, no inflammatory lesions present, color normal  Vagina:     Normal vaginal vault without central or paravaginal defects, no discharge " present, no inflammatory lesions present, no masses present  Bladder:     Nontender to palpation  Urethra:   Urethral Body:  Urethra palpation normal, urethra structural support normal   Urethral Meatus:  No erythema or lesions present  Cervix:     Appearance healthy, no lesions present, nontender to palpation, no bleeding present  Uterus:     Surgically absent  Adnexa:     No adnexal tenderness present, no adnexal masses present  Perineum:     Perineum within normal limits, no evidence of trauma, no rashes or skin lesions present  Anus:     Anus within normal limits, no hemorrhoids present  Inguinal Lymph Nodes:     No lymphadenopathy present  Pubic Hair:     Normal pubic hair distribution for age  Genitalia and Groin:     No rashes present, no lesions present, no areas of discoloration, no masses present      COUNSELING:   Special attention given to:        Regular exercise       Healthy diet/nutrition       Colorectal Cancer Screening       (Maryann)menopause management    BMI: Body mass index is 28.84 kg/m .  Weight management plan: Discussed healthy diet and exercise guidelines    ASSESSMENT:  51 year old female with satisfactory annual exam.    ICD-10-CM    1. Encounter for gynecological examination without abnormal finding  Z01.419        PLAN:  51-year-old female with a normal GYN exam.  We encouraged her to continue with annual mammograms.  Her Pap smear is up-to-date.  She will plan on colonoscopy early next year.  She is to call with any debilitating menopausal symptoms.    JANIE Galindo CNP

## 2022-02-03 ENCOUNTER — OFFICE VISIT (OUTPATIENT)
Dept: OBGYN | Facility: CLINIC | Age: 52
End: 2022-02-03
Payer: COMMERCIAL

## 2022-02-03 ENCOUNTER — ANCILLARY PROCEDURE (OUTPATIENT)
Dept: MAMMOGRAPHY | Facility: CLINIC | Age: 52
End: 2022-02-03
Payer: COMMERCIAL

## 2022-02-03 VITALS
BODY MASS INDEX: 28.28 KG/M2 | DIASTOLIC BLOOD PRESSURE: 88 MMHG | HEIGHT: 66 IN | WEIGHT: 176 LBS | SYSTOLIC BLOOD PRESSURE: 122 MMHG

## 2022-02-03 DIAGNOSIS — Z01.419 ENCOUNTER FOR GYNECOLOGICAL EXAMINATION WITHOUT ABNORMAL FINDING: Primary | ICD-10-CM

## 2022-02-03 DIAGNOSIS — Z12.31 VISIT FOR SCREENING MAMMOGRAM: ICD-10-CM

## 2022-02-03 PROCEDURE — 77067 SCR MAMMO BI INCL CAD: CPT | Mod: TC | Performed by: RADIOLOGY

## 2022-02-03 PROCEDURE — 99396 PREV VISIT EST AGE 40-64: CPT | Performed by: NURSE PRACTITIONER

## 2022-02-03 PROCEDURE — 77063 BREAST TOMOSYNTHESIS BI: CPT | Mod: TC | Performed by: RADIOLOGY

## 2022-02-03 ASSESSMENT — MIFFLIN-ST. JEOR: SCORE: 1432.14

## 2023-04-28 NOTE — LETTER
January 29, 2020    Chantal Lino  [address]    [place] [facility] [address]    Dear ,  This letter is regarding your recent Pap smear (cervical cancer screening) and Human Papillomavirus (HPV) test.  We are happy to inform you that your Pap smear result is normal. Cervical cancer is closely linked with certain types of HPV. Your results showed no evidence of high-risk HPV.  We recommend you have your next PAP smear and HPV test in 5 years.  You will still need to return to the clinic every year for an annual exam and other preventive tests.  If you have additional questions regarding this result, please call our registered nurse, Danika at [phone].  Sincerely,    Shari Shah MD/nina   
<<--- Click to launch

## 2023-06-15 ENCOUNTER — OFFICE VISIT (OUTPATIENT)
Dept: OBGYN | Facility: CLINIC | Age: 53
End: 2023-06-15
Attending: OBSTETRICS & GYNECOLOGY
Payer: COMMERCIAL

## 2023-06-15 ENCOUNTER — ANCILLARY PROCEDURE (OUTPATIENT)
Dept: MAMMOGRAPHY | Facility: CLINIC | Age: 53
End: 2023-06-15
Attending: OBSTETRICS & GYNECOLOGY
Payer: COMMERCIAL

## 2023-06-15 VITALS
BODY MASS INDEX: 28.77 KG/M2 | WEIGHT: 179 LBS | SYSTOLIC BLOOD PRESSURE: 124 MMHG | DIASTOLIC BLOOD PRESSURE: 88 MMHG | HEIGHT: 66 IN

## 2023-06-15 DIAGNOSIS — Z13.820 SPECIAL SCREENING FOR OSTEOPOROSIS: ICD-10-CM

## 2023-06-15 DIAGNOSIS — Z12.31 VISIT FOR SCREENING MAMMOGRAM: ICD-10-CM

## 2023-06-15 DIAGNOSIS — Z01.419 ENCOUNTER FOR GYNECOLOGICAL EXAMINATION WITHOUT ABNORMAL FINDING: Primary | ICD-10-CM

## 2023-06-15 DIAGNOSIS — Z12.11 SCREEN FOR COLON CANCER: ICD-10-CM

## 2023-06-15 PROCEDURE — 77067 SCR MAMMO BI INCL CAD: CPT | Mod: TC | Performed by: RADIOLOGY

## 2023-06-15 PROCEDURE — 87624 HPV HI-RISK TYP POOLED RSLT: CPT | Performed by: NURSE PRACTITIONER

## 2023-06-15 PROCEDURE — 77063 BREAST TOMOSYNTHESIS BI: CPT | Mod: TC | Performed by: RADIOLOGY

## 2023-06-15 PROCEDURE — 99396 PREV VISIT EST AGE 40-64: CPT | Performed by: NURSE PRACTITIONER

## 2023-06-15 PROCEDURE — G0145 SCR C/V CYTO,THINLAYER,RESCR: HCPCS | Performed by: NURSE PRACTITIONER

## 2023-06-15 ASSESSMENT — ANXIETY QUESTIONNAIRES
3. WORRYING TOO MUCH ABOUT DIFFERENT THINGS: NOT AT ALL
6. BECOMING EASILY ANNOYED OR IRRITABLE: NOT AT ALL
7. FEELING AFRAID AS IF SOMETHING AWFUL MIGHT HAPPEN: NOT AT ALL
GAD7 TOTAL SCORE: 0
5. BEING SO RESTLESS THAT IT IS HARD TO SIT STILL: NOT AT ALL
2. NOT BEING ABLE TO STOP OR CONTROL WORRYING: NOT AT ALL
1. FEELING NERVOUS, ANXIOUS, OR ON EDGE: NOT AT ALL
GAD7 TOTAL SCORE: 0

## 2023-06-15 ASSESSMENT — PATIENT HEALTH QUESTIONNAIRE - PHQ9
SUM OF ALL RESPONSES TO PHQ QUESTIONS 1-9: 0
5. POOR APPETITE OR OVEREATING: NOT AT ALL

## 2023-06-19 LAB
BKR LAB AP GYN ADEQUACY: NORMAL
BKR LAB AP GYN INTERPRETATION: NORMAL
BKR LAB AP HPV REFLEX: NORMAL
BKR LAB AP PREVIOUS ABNORMAL: NORMAL
PATH REPORT.COMMENTS IMP SPEC: NORMAL
PATH REPORT.COMMENTS IMP SPEC: NORMAL
PATH REPORT.RELEVANT HX SPEC: NORMAL

## 2023-06-21 LAB
HUMAN PAPILLOMA VIRUS 16 DNA: NEGATIVE
HUMAN PAPILLOMA VIRUS 18 DNA: NEGATIVE
HUMAN PAPILLOMA VIRUS FINAL DIAGNOSIS: NORMAL
HUMAN PAPILLOMA VIRUS OTHER HR: NEGATIVE

## 2024-02-19 ENCOUNTER — TELEPHONE (OUTPATIENT)
Dept: OBGYN | Facility: CLINIC | Age: 54
End: 2024-02-19
Payer: COMMERCIAL

## 2024-02-19 DIAGNOSIS — Z13.820 SPECIAL SCREENING FOR OSTEOPOROSIS: Primary | ICD-10-CM

## 2024-02-19 NOTE — TELEPHONE ENCOUNTER
[facility] Call Center    Phone Message    May a detailed message be left on voicemail: yes     Reason for Call: Other: Pt called to scheduled her annual visits including a provider and mammo. Patient states that she was recommended by Jia Arana to get a DX Hip/Pelvis/Spine scan done. Order will be expiring 6/15/24, and patient is wanting to get all her appointments done on 6/18/24. Please call Pt to schedule dexa scan after order is placed.     Action Taken: Other: [facility]    Travel Screening: Not Applicable

## 2024-06-18 ENCOUNTER — ANCILLARY PROCEDURE (OUTPATIENT)
Dept: MAMMOGRAPHY | Facility: CLINIC | Age: 54
End: 2024-06-18
Payer: COMMERCIAL

## 2024-06-18 ENCOUNTER — ANCILLARY PROCEDURE (OUTPATIENT)
Dept: BONE DENSITY | Facility: CLINIC | Age: 54
End: 2024-06-18
Attending: OBSTETRICS & GYNECOLOGY
Payer: COMMERCIAL

## 2024-06-18 ENCOUNTER — OFFICE VISIT (OUTPATIENT)
Dept: OBGYN | Facility: CLINIC | Age: 54
End: 2024-06-18
Payer: COMMERCIAL

## 2024-06-18 VITALS
SYSTOLIC BLOOD PRESSURE: 150 MMHG | BODY MASS INDEX: 29.86 KG/M2 | HEIGHT: 66 IN | DIASTOLIC BLOOD PRESSURE: 108 MMHG | WEIGHT: 185.8 LBS

## 2024-06-18 DIAGNOSIS — Z13.820 SPECIAL SCREENING FOR OSTEOPOROSIS: ICD-10-CM

## 2024-06-18 DIAGNOSIS — Z01.419 ENCOUNTER FOR GYNECOLOGICAL EXAMINATION WITHOUT ABNORMAL FINDING: ICD-10-CM

## 2024-06-18 DIAGNOSIS — Z12.11 SCREEN FOR COLON CANCER: Primary | ICD-10-CM

## 2024-06-18 DIAGNOSIS — Z12.31 VISIT FOR SCREENING MAMMOGRAM: ICD-10-CM

## 2024-06-18 PROCEDURE — 77067 SCR MAMMO BI INCL CAD: CPT | Mod: TC | Performed by: STUDENT IN AN ORGANIZED HEALTH CARE EDUCATION/TRAINING PROGRAM

## 2024-06-18 PROCEDURE — 99459 PELVIC EXAMINATION: CPT

## 2024-06-18 PROCEDURE — 99396 PREV VISIT EST AGE 40-64: CPT

## 2024-06-18 PROCEDURE — 77063 BREAST TOMOSYNTHESIS BI: CPT | Mod: TC | Performed by: STUDENT IN AN ORGANIZED HEALTH CARE EDUCATION/TRAINING PROGRAM

## 2024-06-18 PROCEDURE — 77080 DXA BONE DENSITY AXIAL: CPT | Mod: TC | Performed by: RADIOLOGY

## 2024-06-18 ASSESSMENT — ANXIETY QUESTIONNAIRES
GAD7 TOTAL SCORE: 0
7. FEELING AFRAID AS IF SOMETHING AWFUL MIGHT HAPPEN: NOT AT ALL
2. NOT BEING ABLE TO STOP OR CONTROL WORRYING: NOT AT ALL
6. BECOMING EASILY ANNOYED OR IRRITABLE: NOT AT ALL
IF YOU CHECKED OFF ANY PROBLEMS ON THIS QUESTIONNAIRE, HOW DIFFICULT HAVE THESE PROBLEMS MADE IT FOR YOU TO DO YOUR WORK, TAKE CARE OF THINGS AT HOME, OR GET ALONG WITH OTHER PEOPLE: NOT DIFFICULT AT ALL
1. FEELING NERVOUS, ANXIOUS, OR ON EDGE: NOT AT ALL
3. WORRYING TOO MUCH ABOUT DIFFERENT THINGS: NOT AT ALL
GAD7 TOTAL SCORE: 0
5. BEING SO RESTLESS THAT IT IS HARD TO SIT STILL: NOT AT ALL

## 2024-06-18 ASSESSMENT — PATIENT HEALTH QUESTIONNAIRE - PHQ9
SUM OF ALL RESPONSES TO PHQ QUESTIONS 1-9: 0
5. POOR APPETITE OR OVEREATING: NOT AT ALL

## 2024-06-18 NOTE — PATIENT INSTRUCTIONS
Thank you for visiting the [facility].    We care about your health! Here are some general tips to help you stay as healthy as possible:    - What we eat and drink provides the nutrients we need to keep our bodies working well. Try to eat a variety of foods including lots of vegetables, fruits, whole grains, and proteins. Try to limit foods and beverages with high sodium, trans fats, added sugars, and alcohol.    - Physical activity is beneficial for both your physical and mental health. Aim for at least 2.5 hours of medium-intensity (walking, biking, yoga, housework, etc) or 1.25 hours of high-intensity (running, jump rope, rowing, etc) aerobic physical activity per week in addition to engaging in strengthening activities at least 2 times per week. Start small and work towards a goal. Any activity is good activity!    - If you are not preventing pregnancy, take a folic acid supplement of 0.4 mg/day.    - Calcium helps build and maintain strong bones, muscles, and nerves. Daily total calcium intake (food + supplements) should be 1000mg (equal to 3-4 servings of calcium rich foods such as milk, cheese, yogurt, seafood, leafy green vegetables, edamame, orange juice with added calcium, and some cereals, among others)    - STI testing is always available to you. Please contact the office whenever you would like to update your testing or have a new or potential exposure.    - Follow sexually transmitted infection (STI) prevention tips: talk to all partners about STI testing, use condoms or other barrier methods to protect yourself and others, and get tested for STIs with each new partner.    - Return for a preventative visit every year    ADVANCE DIRECTIVE  We recommend that everyone make an advance directive about their health care goals and update it every 5 years.    When it comes to decisions about your health, it s important that your health care goals, values and wishes are known. In the  event of a sudden injury or illness, you may not be able to communicate your choices. We encourage you to begin by thinking about what matters most to you. Have conversations with family, friends, cultural and/or antoine leaders, and your health care team. Then, share your goals and wishes for current and future healthcare so your voice is heard when treatment decisions need to be made. See the link below for help with talking about and sharing what is most important to you:     [email or web address]    Please do not hesitate to contact the office if you have any questions or concerns.

## 2024-06-18 NOTE — PROGRESS NOTES
Chantal is a 53 year old  female who presents for annual exam.     Besides routine health maintenance, she has no other health concerns today .    HPI:  The patient's PCP is  Mateusz Juarez MD.  Has annual exam with PCP tomorrow.     Chantal presents today for gynecologic exam. She has not had any major health changes over the past year - no new diagnoses or care team members. She used to walk regularly, has not been as active lately - plans to increase activity level.     She has had some additional stressors this year; mother was diagnosed with renal cancer (now s/p complete resection) and increased responsibilities at work. Feels she has good support amongst family and friends to cope with these stressors.     She had a supracervical hysterectomy  and salpingectomy in 2019, had questioned last year if she was perimenopausal. She reports experiencing frequent hot flashes / difficulty sleeping between 2023 and March of this year, but symptoms have since resolved and she reports no further recurrence.     Completed Mammogram and DEXA today.     Not fasting - will complete labs with PCP tomorrow.     She has no other questions or concerns today.     GYNECOLOGIC HISTORY:    No LMP recorded (lmp unknown). Patient has had a hysterectomy.    Her current contraception method is: hysterectomy.  She  reports that she has never smoked. She has never used smokeless tobacco.    Patient is not sexually active.  STD testing offered?  Declined    Last PHQ-9 score on record =       6/15/2023     8:50 AM   PHQ-9 SCORE   PHQ-9 Total Score 0     Last GAD7 score on record =       6/15/2023     8:50 AM   RICHARD-7 SCORE   Total Score 0     HEALTH MAINTENANCE:    Overdue       Never done ADVANCE CARE PLANNING (Every 5 Years)     Never done GLUCOSE (Every 3 Years)     Never done COLORECTAL CANCER SCREENING (View topic details)     Never done HIV SCREENING (Once)     Never done HEPATITIS C SCREENING (Once)     Never done  HEPATITIS B IMMUNIZATION (1 of 3 - 19+ 3-dose series)     DEC 4  2020 LIPID (Yearly)  Last completed: Dec 4, 2019     SEP 1  2023 COVID-19 Vaccine ( season)  Last completed: Dec 29, 2021     EDVIN 1  2024 PHQ-2 (once per calendar year) (Yearly, January to December)  Last completed: Nathaniel 15, 2023     NATHANIEL 15  2024 YEARLY PREVENTIVE VISIT (Yearly)  Last completed: Nathaniel 15, 2023     NATHANIEL 15  2024 MAMMO SCREENING (Yearly)   Scheduled for: 2024         Upcoming       SEP 1  2024 INFLUENZA VACCINE (Season Ended)  Last completed: Oct 12, 2011     JUL 31  2025 DTAP/TDAP/TD IMMUNIZATION (3 - Td or Tdap)  Last completed: 2015     NATHANIEL 15  2028 HPV TEST (Once)  Last completed: Nathaniel 15, 2023     NATHANIEL 15  2028 PAP (Every 5 Years)  Last completed: Nathaniel 15, 2023       Health maintenance updated:  yes    HISTORY:  OB History    Para Term  AB Living   0 0 0 0 0 0   SAB IAB Ectopic Multiple Live Births   0 0 0 0 0       Patient Active Problem List   Diagnosis    Essential hypertension    Intramural leiomyoma of uterus    Hyperlipidemia    Vitamin D deficiency     Past Surgical History:   Procedure Laterality Date    CYSTOSCOPY N/A 2019    Procedure: CYSTOSCOPY;  Surgeon: Shari Shah MD;  Location:  OR    DILATION AND CURETTAGE      resection of fibroid-operative hysteroscopy, D and c and resection of submucosal fibroid    ENT SURGERY      ear surgery, Ear tubes, tympanoplasty as child    GYN SURGERY      ovarian cyst surgery    HYSTEROSCOPY, LAPAROSCOPY, COMBINED  2011    Procedure:COMBINED HYSTEROSCOPY, LAPAROSCOPY; diagnostic hysterectomy, dilation and curattage, laparoscopy left paraovarian cystectomy.; Surgeon:JASMYNE MICHAEL; Location:[facility]    LAPAROSCOPIC HYSTERECTOMY SUPRACERVICAL N/A 2019    Procedure: LAPAROSCOPIC SUPRACERVICAL HYSTERECTOMY, BILATERAL SALPINGECTOMY, DIAGNOSTIC CYSTOSCOPY;  Surgeon: Shari Shah MD;  Location:  OR  "   LAPAROSCOPIC SALPINGECTOMY Bilateral 2019    Procedure: LAPAROSCOPIC BILATERAL SALPINGECTOMY;  Surgeon: Shari Sahh MD;  Location:  OR      Social History     Tobacco Use    Smoking status: Never    Smokeless tobacco: Never   Substance Use Topics    Alcohol use: Yes     Alcohol/week: 0.0 standard drinks of alcohol     Comment: OCCASIONAL      Problem (# of Occurrences) Relation (Name,Age of Onset)    Diabetes (1) Maternal Grandmother    Heart Disease (1) Sister (34): sister had heart attack at age 34    Hypertension (1) Other: not specified    Osteoporosis (1) Mother    Cerebrovascular Disease (3) Other: aunt, Father:  of stroke-unspecified if father, aunt or PGF, Paternal Grandfather    Hyperlipidemia (1) Other: not specified    Colon Polyps (1) Mother    Colon Cancer (1) Maternal Grandmother              Current Outpatient Medications   Medication Sig Dispense Refill    atorvastatin (LIPITOR) 20 MG tablet       Cholecalciferol (VITAMIN D3 PO) Take 2,000 Units by mouth every morning       lisinopril-hydrochlorothiazide (PRINZIDE,ZESTORETIC) 20-12.5 MG per tablet Take 1 tablet by mouth every morning       Multiple Vitamin (MULTIVITAMIN OR) Take 1 tablet by mouth every morning       Omega-3 Fatty Acids (FISH OIL) 1200 MG capsule Take 1,200 mg by mouth every morning        No current facility-administered medications for this visit.     No Known Allergies    Past medical, surgical, social and family histories were reviewed and updated in EPIC.    ROS:   12 point review of systems negative other than symptoms noted below or in the HPI.  No urinary frequency or dysuria, bladder or kidney problems    EXAM:  BP (!) 150/108   Ht 1.664 m (5' 5.5\")   Wt 84.3 kg (185 lb 12.8 oz)   LMP  (LMP Unknown)   BMI 30.45 kg/m     BMI: Body mass index is 30.45 kg/m .    PHYSICAL EXAM:  Constitutional:   Appearance: Well nourished, well developed, alert, in no acute distress  Neck:  Lymph Nodes:  No " lymphadenopathy present    Thyroid:  Gland size normal, nontender, no nodules or masses present  on palpation  Chest:  Respiratory Effort:  Breathing unlabored  Cardiovascular:    Heart: Auscultation:  Regular rate, normal rhythm, no murmurs present  Breasts: Inspection of Breasts:  No lymphadenopathy present., Palpation of Breasts and Axillae:  No masses present on palpation, no breast tenderness., Axillary Lymph Nodes:  No lymphadenopathy present., and No nodularity, asymmetry or nipple discharge bilaterally.  Gastrointestinal:   Abdominal Examination:  Abdomen nontender to palpation, tone normal without rigidity or guarding, no masses present, umbilicus without lesions   Liver and Spleen:  No hepatomegaly present, liver nontender to palpation    Hernias:  No hernias present  Lymphatic: Lymph Nodes:  No other lymphadenopathy present  Skin:  General Inspection:  No rashes present, no lesions present, no areas of  discoloration  Neurologic:    Mental Status:  Oriented X3.  Normal strength and tone, sensory exam                grossly normal, mentation intact and speech normal.    Psychiatric:   Mentation appears normal and affect normal/bright.         Pelvic Exam:  External Genitalia:     Normal appearance for age, no discharge present, no tenderness present, no inflammatory lesions present, color normal  Vagina:     Normal vaginal vault without central or paravaginal defects, no discharge present, no inflammatory lesions present, no masses present  Bladder:     Nontender to palpation  Urethra:   Urethral Body:  Urethra palpation normal, urethra structural support normal   Urethral Meatus:  No erythema or lesions present  Uterus:  surgically absent   Adnexa:     No adnexal tenderness present, no adnexal masses present  Perineum:     Perineum within normal limits, no evidence of trauma, no rashes or skin lesions present  Anus:     Anus within normal limits, no hemorrhoids present  Inguinal Lymph Nodes:     No  lymphadenopathy present  Pubic Hair:     Normal pubic hair distribution for age  Genitalia and Groin:     No rashes present, no lesions present, no areas of discoloration, no masses present    COUNSELING:   Reviewed preventive health counseling, as reflected in patient instructions       Regular exercise       Osteoporosis prevention/bone health       (Maryann)menopause management       Advance Care Planning    BMI: Body mass index is 30.45 kg/m .  Weight management plan: Discussed healthy diet and exercise guidelines    ASSESSMENT:  53 year old female with satisfactory annual exam.    ICD-10-CM    1. Screen for colon cancer  Z12.11       2. Encounter for gynecological examination without abnormal finding  Z01.419           PLAN:  Chantal presents for annual gynecologic exam.     Health Maintenance  - PAP UTD , last done 6/2023 (NIL, - HPV)  - Mammogram completed today. Counseled on self breast exams  - DEXA completed today   - Colon Cancer Screen due, referral placed  - provided information about Hon[employer] Choices health care planning  - Labs to be completed with PCP at annual exam tomorrow  - counseled on eligibility for COVID booster and Hepatitis B vaccines    Elevated blood pressure, elevated again on repeat. Patient on medications for blood pressure management, will follow up tomorrow with PCP.     Patient can return in 1 year for annual exam, sooner with concerns.    Nyla Castaneda PA-C

## 2024-07-17 ENCOUNTER — TRANSFERRED RECORDS (OUTPATIENT)
Dept: MULTI SPECIALTY CLINIC | Facility: CLINIC | Age: 54
End: 2024-07-17

## 2024-07-17 LAB
CHOLESTEROL (EXTERNAL): 150 MG/DL (ref 100–199)
CREATININE (EXTERNAL): 1.01 MG/DL (ref 0.5–0.9)
GFR ESTIMATED (EXTERNAL): 68 ML/MIN/1.73M2
GLUCOSE (EXTERNAL): 103 MG/DL (ref 70–99)
HDLC SERPL-MCNC: 32 MG/DL
LDL CHOLESTEROL CALCULATED (EXTERNAL): 79 MG/DL
NON HDL CHOLESTEROL (EXTERNAL): 118 MG/DL
POTASSIUM (EXTERNAL): 4.1 MMOL/L (ref 3.5–5.1)
TRIGLYCERIDES (EXTERNAL): 196 MG/DL

## 2024-07-18 ENCOUNTER — HOSPITAL ENCOUNTER (OUTPATIENT)
Dept: MAMMOGRAPHY | Facility: CLINIC | Age: 54
Discharge: HOME | End: 2024-07-18
Attending: OBSTETRICS & GYNECOLOGY
Payer: COMMERCIAL

## 2024-07-18 DIAGNOSIS — R92.8 ABNORMAL MAMMOGRAM: ICD-10-CM

## 2024-07-18 PROCEDURE — 76642 ULTRASOUND BREAST LIMITED: CPT | Mod: RT

## 2024-07-18 PROCEDURE — 77061 BREAST TOMOSYNTHESIS UNI: CPT | Mod: RT

## 2025-06-12 NOTE — PROGRESS NOTES
Chantal is a 54 year old  female who presents for annual exam.     Besides routine health maintenance, she has no other health concerns today .    HPI:  The patient's PCP is Mateusz Juarez MD.      Chantal is a 53 yo female with PMHx menorrhagia and uterine fibroids s/p supracervical hysterectomy and bilateral salpingectomy (), papillary carcinoma of the thyroid s/p right thyroid lobectomy (), benign left thyroid nodule (followed by endocrinology), hypertension (monitored by PCP) who presents for annual gynecologic exam.     This past year has had some increased stressors with thyroid diagnosis / surgery and difficulty with titration of medications for hypertension. Has home blood pressure cuff and aware of when to be seen for emergent symptoms.     Her PCP recently retired, establishing with new provider but hasn't completed lipid screening or metabolic labs this year. She is fasting today - requests labs.     She completes occasional self breast exams. Last year completed additional breast US after mammogram for monitoring of benign breast cyst. Completed annual mammogram this morning.     DEXA completed 2024 with normal results.     She has had an increase in hot flashes / night sweats over the past year, discussed with endocrine provider at most recent visit. She was started on Gabapentin 300 mg which has greatly improved this symptoms.     Uses a diuretic (hydrochlorothiazide) for blood pressure management, urinates more often with this medication but no changes from baseline. Not currently sexually active. Denies any vaginal symptoms.     She is due for colon cancer screening, would like to be referred to Dr. Ct Whittington at [facility] as she has seen her previously for thyroidectomy.     GYNECOLOGIC HISTORY:    No LMP recorded (lmp unknown). Patient has had a hysterectomy.  Her current contraception method is: hysterectomy.  She  reports that she has never smoked. She has never  been exposed to tobacco smoke. She has never used smokeless tobacco.  Patient is not sexually active.  STD testing offered?  Declined  Last PHQ-9 score on record =       6/18/2024    11:51 AM   PHQ-9 SCORE   PHQ-9 Total Score 0     Last GAD7 score on record =       6/18/2024    11:51 AM   RICHARD-7 SCORE   Total Score 0     Alcohol Score = 0    HEALTH MAINTENANCE:  Cholesterol:  ontains abnormal data LIPID PANEL W REFLEX MEASURED LDL  Order: [id number]  Component  Ref Range & Units 11 mo ago   CHOLESTEROL,TOTAL  100 - 199 mg/dL 150   Comment: Cholesterol, Total Reference Ranges    Desirable <200 mg/dL  Borderline 200-239 mg/dL  High >=240 mg/dL   TRIGLYCERIDES  <150 mg/dL 196 High    HDL CHOLESTEROL  >40 mg/dL 32 Low    NON-HDL CHOLESTEROL  <145 mg/dl 118   CHOL/HDL RATIO  <4.50 4.69 High    LDL CHOLESTEROL  <=130 mg/dL 79   VLDL CHOLESTEROL  <=30 mg/dL 39 High    PROVIDER ORDERED STATUS RANDOM   Resulting Agency [facility]     Specimen Collected: 07/17/24  8:12 AM    Performed by: [facility] Last Resulted: 07/17/24 10:00 AM   [facility]  Result Received: 07/18/24  8:16 AM     Last Mammo: 7/18/2024, Result: Benign, Next Mammo: Today  Pap:   Lab Results   Component Value Date    GYNINTERP  06/15/2023     Negative for Intraepithelial Lesion or Malignancy (NILM) NEG HPV    PAP NIL 01/21/2020    PAP NIL 01/05/2017   Colonoscopy: patient is overdue.  Dexa: due at age 67.    Health maintenance updated:  yes    Overdue     DEC 4  2020 LIPID (Yearly)  Last completed: Dec 4, 2019   SEP 1  2024 COVID-19 VACCINE (4 - 2024-25 season)  Last completed: Dec 29, 2021   EDVIN 1  2025 PHQ-2 (once per calendar year) (Yearly, January to December)  Last completed: Jun 18, 2024   Never done BMP (Yearly)   Never done ADVANCE CARE PLANNING (Every 5 Years)   Never done DIABETES SCREENING (Every 3 Years)   Never done HIV SCREENING (Once)   Never done HEPATITIS B VACCINE (1 of 3 - 19+ 3-dose  series)   Never done PNEUMOCOCCAL VACCINE 50+ YEARS (1 of           HISTORY:  OB History    Para Term  AB Living   0 0 0 0 0 0   SAB IAB Ectopic Multiple Live Births   0 0 0 0 0       Patient Active Problem List   Diagnosis    Essential hypertension    Intramural leiomyoma of uterus    Hyperlipidemia    Vitamin D deficiency     Past Surgical History:   Procedure Laterality Date    CYSTOSCOPY N/A 2019    Procedure: CYSTOSCOPY;  Surgeon: Shari Shah MD;  Location:  OR    DILATION AND CURETTAGE      resection of fibroid-operative hysteroscopy, D and c and resection of submucosal fibroid    ENT SURGERY      ear surgery, Ear tubes, tympanoplasty as child    GYN SURGERY      ovarian cyst surgery    HYSTEROSCOPY, LAPAROSCOPY, COMBINED  2011    Procedure:COMBINED HYSTEROSCOPY, LAPAROSCOPY; diagnostic hysterectomy, dilation and curattage, laparoscopy left paraovarian cystectomy.; Surgeon:JASMYNE MICHAEL; Location: [facility]    LAPAROSCOPIC HYSTERECTOMY SUPRACERVICAL N/A 2019    Procedure: LAPAROSCOPIC SUPRACERVICAL HYSTERECTOMY, BILATERAL SALPINGECTOMY, DIAGNOSTIC CYSTOSCOPY;  Surgeon: Shari Shah MD;  Location:  OR    LAPAROSCOPIC SALPINGECTOMY Bilateral 2019    Procedure: LAPAROSCOPIC BILATERAL SALPINGECTOMY;  Surgeon: Shari Shah MD;  Location:  OR      Social History     Tobacco Use    Smoking status: Never     Passive exposure: Never    Smokeless tobacco: Never   Substance Use Topics    Alcohol use: Yes     Alcohol/week: 0.0 standard drinks of alcohol     Comment: OCCASIONAL      Problem (# of Occurrences) Relation (Name,Age of Onset)    Diabetes (1) Maternal Grandmother    Heart Disease (1) Sister (34): sister had heart attack at age 34    Hypertension (1) Other: not specified    Osteoporosis (1) Mother    Cerebrovascular Disease (3) Father:  of stroke-unspecified if father, aunt or PGF, Paternal Grandfather, Other:  aunt    Hyperlipidemia (1) Other: not specified    Colon Polyps (1) Mother    Colon Cancer (1) Maternal Grandmother    Kidney Cancer (1) Mother: s/p nephrectomy, diagnosed 2024              Current Outpatient Medications   Medication Sig Dispense Refill    atorvastatin (LIPITOR) 20 MG tablet       Cholecalciferol (VITAMIN D3 PO) Take 2,000 Units by mouth every morning       gabapentin (NEURONTIN) 300 MG capsule Take 300 mg by mouth daily.      hydroCHLOROthiazide 12.5 MG tablet Take 12.5 mg by mouth daily.      levothyroxine (SYNTHROID/LEVOTHROID) 75 MCG tablet Take 75 mcg by mouth daily.      Multiple Vitamin (MULTIVITAMIN OR) Take 1 tablet by mouth every morning       valsartan (DIOVAN) 80 MG tablet Take 80 mg by mouth daily.      lisinopril-hydrochlorothiazide (PRINZIDE,ZESTORETIC) 20-12.5 MG per tablet Take 1 tablet by mouth every morning  (Patient not taking: Reported on 6/23/2025)      Omega-3 Fatty Acids (FISH OIL) 1200 MG capsule Take 1,200 mg by mouth every morning  (Patient not taking: Reported on 6/23/2025)       No current facility-administered medications for this visit.     No Known Allergies    Past medical, surgical, social and family histories were reviewed and updated in EPIC.    EXAM:  /80 (BP Location: Right arm, Patient Position: Sitting, Cuff Size: Adult Large)   LMP  (LMP Unknown)    BMI: There is no height or weight on file to calculate BMI.    PHYSICAL EXAM:  Constitutional:   Appearance: Well nourished, well developed, alert, in no acute distress  Neck:  Lymph Nodes:  No lymphadenopathy present    Thyroid:  Gland size normal, nontender, no nodules or masses present  on palpation  Chest:  Respiratory Effort:  Breathing unlabored  Breasts: Inspection of Breasts:  No lymphadenopathy present., Palpation of Breasts and Axillae:  No masses present on palpation, no breast tenderness., Axillary Lymph Nodes:  No lymphadenopathy present., and No nodularity, asymmetry or nipple discharge  bilaterally.  Skin:  General Inspection:  No rashes present, no lesions present, no areas of  discoloration  Neurologic:    Mental Status:  Oriented X3.  Normal strength and tone, sensory exam                grossly normal, mentation intact and speech normal.    Psychiatric:   Mentation appears normal and affect normal/bright.     Pelvic Exam:  External Genitalia:     Normal appearance for age, no discharge present, no tenderness present, no inflammatory lesions present, color normal  Vagina:     Normal vaginal vault without central or paravaginal defects, no discharge present, no inflammatory lesions present, no masses present  Bladder:     Nontender to palpation  Urethra:   Urethral Body:  Urethra palpation normal, urethra structural support normal   Urethral Meatus:  No erythema or lesions present  Cervix:     Appearance healthy, no lesions present, nontender to palpation, no bleeding present  Uterus:     SURGICALLY ABSENT  Adnexa:     No adnexal tenderness present, no adnexal masses present  Perineum:     Perineum within normal limits, no evidence of trauma, no rashes or skin lesions present  Anus:     Anus within normal limits, no hemorrhoids present  Inguinal Lymph Nodes:     No lymphadenopathy present  Pubic Hair:     Normal pubic hair distribution for age  Genitalia and Groin:     No rashes present, no lesions present, no areas of discoloration, no masses present    COUNSELING:   Reviewed preventive health counseling, as reflected in patient instructions       Regular exercise       Healthy diet/nutrition       Osteoporosis prevention/bone health       Colorectal Cancer Screening    BMI: There is no height or weight on file to calculate BMI.      ASSESSMENT:  54 year old female with satisfactory annual exam.    ICD-10-CM    1. Vitamin D deficiency  E55.9 Vitamin D Deficiency     Vitamin D Deficiency      2. Screen for colon cancer  Z12.11 Colonoscopy Screening  Referral      3. Screening for  metabolic disorder  Z13.228 Comprehensive metabolic panel (BMP + Alb, Alk Phos, ALT, AST, Total. Bili, TP)     Comprehensive metabolic panel (BMP + Alb, Alk Phos, ALT, AST, Total. Bili, TP)      4. Encounter for screening for HIV  Z11.4 HIV Antigen Antibody Combo     HIV Antigen Antibody Combo      5. Screening for lipid disorders  Z13.220 Lipid panel reflex to direct LDL Non-fasting     Lipid panel reflex to direct LDL Non-fasting          PLAN:  Chantal is a 53 yo female with PMHx menorrhagia and uterine fibroids s/p supracervical hysterectomy and bilateral salpingectomy (2019), papillary carcinoma of the thyroid s/p right thyroid lobectomy (9/24), benign left thyroid nodule (followed by endocrinology), hypertension (monitored by PCP) who presents for annual gynecologic exam.     Vasomotor Symptoms of Menopause  - currently on Gabapentin 300 mg daily per Endocrine provider with good symptom relief  - may return to endocrine or gynecology if new / changing symptoms or concerns     Health Maintenance  - PAP UTD, last done 6/2023 (NIL/-HPV)  - Mammogram completed today. Counseled on self breast exams  - Colon Cancer Screen due, referral placed to patient's preferred provider  - Labs ordered (fasting lipids, CMP, HIV, Vit D). Results and recommendation via MyChart  - STI screening declined  - Provided information about Honoring Choices advance health care planning    Patient can return in 1 year for annual exam, sooner with concerns.     Nyla Castaneda PA-C

## 2025-06-23 ENCOUNTER — OFFICE VISIT (OUTPATIENT)
Dept: OBGYN | Facility: CLINIC | Age: 55
End: 2025-06-23
Payer: COMMERCIAL

## 2025-06-23 ENCOUNTER — ANCILLARY PROCEDURE (OUTPATIENT)
Dept: MAMMOGRAPHY | Facility: CLINIC | Age: 55
End: 2025-06-23
Payer: COMMERCIAL

## 2025-06-23 VITALS — SYSTOLIC BLOOD PRESSURE: 118 MMHG | DIASTOLIC BLOOD PRESSURE: 80 MMHG

## 2025-06-23 DIAGNOSIS — Z12.31 VISIT FOR SCREENING MAMMOGRAM: ICD-10-CM

## 2025-06-23 DIAGNOSIS — Z13.228 SCREENING FOR METABOLIC DISORDER: ICD-10-CM

## 2025-06-23 DIAGNOSIS — E55.9 VITAMIN D DEFICIENCY: Primary | ICD-10-CM

## 2025-06-23 DIAGNOSIS — Z13.220 SCREENING FOR LIPID DISORDERS: ICD-10-CM

## 2025-06-23 DIAGNOSIS — Z12.11 SCREEN FOR COLON CANCER: ICD-10-CM

## 2025-06-23 DIAGNOSIS — Z11.4 ENCOUNTER FOR SCREENING FOR HIV: ICD-10-CM

## 2025-06-23 LAB
ALBUMIN SERPL BCG-MCNC: 4.4 G/DL (ref 3.5–5.2)
ALP SERPL-CCNC: 129 U/L (ref 40–150)
ALT SERPL W P-5'-P-CCNC: 37 U/L (ref 0–50)
ANION GAP SERPL CALCULATED.3IONS-SCNC: 11 MMOL/L (ref 7–15)
AST SERPL W P-5'-P-CCNC: 33 U/L (ref 0–45)
BILIRUB SERPL-MCNC: 0.4 MG/DL
BUN SERPL-MCNC: 14 MG/DL (ref 6–20)
CALCIUM SERPL-MCNC: 10.5 MG/DL (ref 8.8–10.4)
CHLORIDE SERPL-SCNC: 103 MMOL/L (ref 98–107)
CHOLEST SERPL-MCNC: 160 MG/DL
CREAT SERPL-MCNC: 0.96 MG/DL (ref 0.51–0.95)
EGFRCR SERPLBLD CKD-EPI 2021: 71 ML/MIN/1.73M2
FASTING STATUS PATIENT QL REPORTED: YES
FASTING STATUS PATIENT QL REPORTED: YES
GLUCOSE SERPL-MCNC: 90 MG/DL (ref 70–99)
HCO3 SERPL-SCNC: 25 MMOL/L (ref 22–29)
HDLC SERPL-MCNC: 34 MG/DL
HIV 1+2 AB+HIV1 P24 AG SERPL QL IA: NONREACTIVE
LDLC SERPL CALC-MCNC: 81 MG/DL
NONHDLC SERPL-MCNC: 126 MG/DL
POTASSIUM SERPL-SCNC: 3.9 MMOL/L (ref 3.4–5.3)
PROT SERPL-MCNC: 6.9 G/DL (ref 6.4–8.3)
SODIUM SERPL-SCNC: 139 MMOL/L (ref 135–145)
TRIGL SERPL-MCNC: 224 MG/DL
VIT D+METAB SERPL-MCNC: 54 NG/ML (ref 20–50)

## 2025-06-23 PROCEDURE — 99396 PREV VISIT EST AGE 40-64: CPT

## 2025-06-23 PROCEDURE — 80061 LIPID PANEL: CPT

## 2025-06-23 PROCEDURE — 77067 SCR MAMMO BI INCL CAD: CPT | Mod: TC | Performed by: RADIOLOGY

## 2025-06-23 PROCEDURE — 77063 BREAST TOMOSYNTHESIS BI: CPT | Mod: TC | Performed by: RADIOLOGY

## 2025-06-23 PROCEDURE — 87389 HIV-1 AG W/HIV-1&-2 AB AG IA: CPT

## 2025-06-23 PROCEDURE — 3074F SYST BP LT 130 MM HG: CPT

## 2025-06-23 PROCEDURE — 82306 VITAMIN D 25 HYDROXY: CPT

## 2025-06-23 PROCEDURE — 80053 COMPREHEN METABOLIC PANEL: CPT

## 2025-06-23 PROCEDURE — 99459 PELVIC EXAMINATION: CPT

## 2025-06-23 PROCEDURE — 3079F DIAST BP 80-89 MM HG: CPT

## 2025-06-23 PROCEDURE — 36415 COLL VENOUS BLD VENIPUNCTURE: CPT

## 2025-06-23 RX ORDER — GABAPENTIN 300 MG/1
300 CAPSULE ORAL DAILY
COMMUNITY

## 2025-06-23 RX ORDER — VALSARTAN 80 MG/1
80 TABLET ORAL DAILY
COMMUNITY
Start: 2025-05-29

## 2025-06-23 RX ORDER — LEVOTHYROXINE SODIUM 75 UG/1
75 TABLET ORAL DAILY
COMMUNITY
Start: 2025-05-20

## 2025-06-23 RX ORDER — HYDROCHLOROTHIAZIDE 12.5 MG/1
12.5 TABLET ORAL DAILY
COMMUNITY
Start: 2025-05-29

## 2025-06-23 NOTE — PATIENT INSTRUCTIONS
Thank you for visiting the [facility].    We care about your health! Here are some general tips to help you stay as healthy as possible:    - What we eat and drink provides the nutrients we need to keep our bodies working well. Try to eat a variety of foods including lots of vegetables, fruits, whole grains, and proteins. Try to limit foods and beverages with high sodium, trans fats, added sugars, and alcohol.    - Physical activity is beneficial for both your physical and mental health. Aim for at least 2.5 hours of medium-intensity (walking, biking, yoga, housework, etc) or 1.25 hours of high-intensity (running, jump rope, rowing, etc) aerobic physical activity per week in addition to engaging in strengthening activities at least 2 times per week. Start small and work towards a goal. Any activity is good activity!    - Calcium helps build and maintain strong bones, muscles, and nerves. Daily total calcium intake (food + supplements) should be 1000mg (equal to 3-4 servings of calcium rich foods such as milk, cheese, yogurt, seafood, leafy green vegetables, edamame, orange juice with added calcium, and some cereals, among others)    - STI testing is always available to you. Please contact the office whenever you would like to update your testing or have a new or potential exposure.    - Follow sexually transmitted infection (STI) prevention tips: talk to all partners about STI testing, use condoms or other barrier methods to protect yourself and others, and get tested for STIs with each new partner.    - Return for a preventative visit every year    ADVANCE DIRECTIVE  We recommend that everyone make an advance directive about their health care goals and update it every 5 years.    When it comes to decisions about your health, it s important that your health care goals, values and wishes are known. In the event of a sudden injury or illness, you may not be able to communicate your choices. We  encourage you to begin by thinking about what matters most to you. Have conversations with family, friends, cultural and/or antoine leaders, and your health care team. Then, share your goals and wishes for current and future healthcare so your voice is heard when treatment decisions need to be made. See the link below for help with talking about and sharing what is most important to you:     [email or web address]    Please do not hesitate to contact the office if you have any questions or concerns.

## 2025-06-24 ENCOUNTER — RESULTS FOLLOW-UP (OUTPATIENT)
Dept: OBGYN | Facility: CLINIC | Age: 55
End: 2025-06-24

## (undated) DEVICE — ESU HOLDER LAP INST DISP PURPLE LONG 330MM H-PRO-330

## (undated) DEVICE — GLOVE PROTEXIS MICRO 7.0  2D73PM70

## (undated) DEVICE — SYR 03ML LL W/O NDL 309657

## (undated) DEVICE — LINEN TOWEL PACK X5 5464

## (undated) DEVICE — GLOVE PROTEXIS W/NEU-THERA 6.0  2D73TE60

## (undated) DEVICE — ENDO TROCAR FIRST ENTRY KII FIOS ADV FIX 05X100MM CFF03

## (undated) DEVICE — PREP DURAPREP 26ML APL 8630

## (undated) DEVICE — GLOVE PROTEXIS BLUE W/NEU-THERA 7.0  2D73EB70

## (undated) DEVICE — SUCTION CANISTER MEDIVAC LINER 3000ML W/LID 65651-530

## (undated) DEVICE — SYR 10ML FINGER CONTROL W/O NDL 309695

## (undated) DEVICE — SOL NACL 0.9% INJ 250ML BAG 2B1322Q

## (undated) DEVICE — DRAPE ISOLATION BAG 1003

## (undated) DEVICE — TUBING C02 INSUFFLATION LAP FILTER HEATER 6198

## (undated) DEVICE — NDL 19GA 1.5"

## (undated) DEVICE — ENDO MORCELLATOR OLYMPUS PNEUMOLINER WA90500US

## (undated) DEVICE — DECANTER BAG 2002S

## (undated) DEVICE — JELLY LUBRICATING 10ML SYR STERILE

## (undated) DEVICE — ESU CORD MONOPOLAR 10'  E0510

## (undated) DEVICE — SUCTION IRR STRYKERFLOW II W/TIP 250-070-520

## (undated) DEVICE — SOL WATER 3000ML BAG 7973-08

## (undated) DEVICE — SOL NACL 0.9% INJ 1000ML BAG 2B1324X

## (undated) DEVICE — SOL NACL 0.9% IRRIG 1000ML BOTTLE 2F7124

## (undated) DEVICE — ENDO TROCAR FIRST ENTRY KII FIOS Z-THRD 11X100MM CTF33

## (undated) DEVICE — GLOVE PROTEXIS POWDER FREE SMT 7.5  2D72PT75X

## (undated) DEVICE — SU MONOCRYL 4-0 PS-2 18" UND Y496G

## (undated) DEVICE — SU VICRYL 0 UR-6 27" J603H

## (undated) DEVICE — ESU HANDPIECE BIPOLAR THUNDERBEAT FC 5MMX35CM TB-0535FC

## (undated) DEVICE — SOL WATER IRRIG 1000ML BOTTLE 2F7114

## (undated) DEVICE — CATH TRAY FOLEY SURESTEP 16FR WDRAIN BAG STLK LATEX A300316A

## (undated) DEVICE — Device

## (undated) DEVICE — WIPES FOLEY CARE SURESTEP PROVON DFC100

## (undated) DEVICE — EVAC SYSTEM CLEAR FLOW SC082500

## (undated) DEVICE — GLOVE PROTEXIS W/NEU-THERA 6.5  2D73TE65

## (undated) DEVICE — SYSTEM CLEARIFY VISUALIZATION 21-345

## (undated) RX ORDER — ACETAMINOPHEN 325 MG/1
TABLET ORAL
Status: DISPENSED
Start: 2019-05-01

## (undated) RX ORDER — FENTANYL CITRATE 50 UG/ML
INJECTION, SOLUTION INTRAMUSCULAR; INTRAVENOUS
Status: DISPENSED
Start: 2019-05-01

## (undated) RX ORDER — PROPOFOL 10 MG/ML
INJECTION, EMULSION INTRAVENOUS
Status: DISPENSED
Start: 2019-05-01

## (undated) RX ORDER — HYDROMORPHONE HYDROCHLORIDE 1 MG/ML
INJECTION, SOLUTION INTRAMUSCULAR; INTRAVENOUS; SUBCUTANEOUS
Status: DISPENSED
Start: 2019-05-01

## (undated) RX ORDER — PHENAZOPYRIDINE HYDROCHLORIDE 200 MG/1
TABLET, FILM COATED ORAL
Status: DISPENSED
Start: 2019-05-01

## (undated) RX ORDER — ONDANSETRON 2 MG/ML
INJECTION INTRAMUSCULAR; INTRAVENOUS
Status: DISPENSED
Start: 2019-05-01

## (undated) RX ORDER — LIDOCAINE HYDROCHLORIDE 20 MG/ML
INJECTION, SOLUTION EPIDURAL; INFILTRATION; INTRACAUDAL; PERINEURAL
Status: DISPENSED
Start: 2019-05-01

## (undated) RX ORDER — IBUPROFEN 600 MG/1
TABLET, FILM COATED ORAL
Status: DISPENSED
Start: 2019-05-01

## (undated) RX ORDER — CEFAZOLIN SODIUM 2 G/100ML
INJECTION, SOLUTION INTRAVENOUS
Status: DISPENSED
Start: 2019-05-01

## (undated) RX ORDER — DEXAMETHASONE SODIUM PHOSPHATE 4 MG/ML
INJECTION, SOLUTION INTRA-ARTICULAR; INTRALESIONAL; INTRAMUSCULAR; INTRAVENOUS; SOFT TISSUE
Status: DISPENSED
Start: 2019-05-01